# Patient Record
Sex: MALE | Race: WHITE | NOT HISPANIC OR LATINO | URBAN - METROPOLITAN AREA
[De-identification: names, ages, dates, MRNs, and addresses within clinical notes are randomized per-mention and may not be internally consistent; named-entity substitution may affect disease eponyms.]

---

## 2023-08-22 PROBLEM — M79.652 LEFT THIGH PAIN: Status: ACTIVE | Noted: 2023-08-22

## 2023-08-22 PROBLEM — M65.90 TENOSYNOVITIS: Status: ACTIVE | Noted: 2023-08-22

## 2023-08-22 PROBLEM — I10 ESSENTIAL HYPERTENSION: Status: ACTIVE | Noted: 2023-08-22

## 2023-08-22 PROBLEM — Z96.1 ARTIFICIAL LENS PRESENT: Status: ACTIVE | Noted: 2023-08-22

## 2023-08-22 PROBLEM — I48.19 PERSISTENT ATRIAL FIBRILLATION (MULTI): Status: ACTIVE | Noted: 2023-08-22

## 2023-08-22 PROBLEM — R42 LIGHTHEADEDNESS: Status: ACTIVE | Noted: 2023-08-22

## 2023-08-22 PROBLEM — M25.552 LEFT HIP PAIN: Status: ACTIVE | Noted: 2023-08-22

## 2023-08-22 PROBLEM — E55.9 VITAMIN D DEFICIENCY: Status: ACTIVE | Noted: 2023-08-22

## 2023-08-22 PROBLEM — M1A.9XX0 CHRONIC GOUT WITHOUT TOPHUS: Status: ACTIVE | Noted: 2023-08-22

## 2023-08-22 PROBLEM — I27.20 PULMONARY HTN (MULTI): Status: ACTIVE | Noted: 2023-08-22

## 2023-08-22 PROBLEM — L03.119 CELLULITIS AND ABSCESS OF UPPER EXTREMITY: Status: ACTIVE | Noted: 2023-08-22

## 2023-08-22 PROBLEM — L02.419 CELLULITIS AND ABSCESS OF UPPER EXTREMITY: Status: ACTIVE | Noted: 2023-08-22

## 2023-08-22 PROBLEM — N40.1 BENIGN LOCALIZED PROSTATIC HYPERPLASIA WITH LOWER URINARY TRACT SYMPTOMS (LUTS): Status: ACTIVE | Noted: 2023-08-22

## 2023-08-22 PROBLEM — R55 SYNCOPE AND COLLAPSE: Status: ACTIVE | Noted: 2023-08-22

## 2023-08-22 PROBLEM — E11.9 TYPE 2 DIABETES MELLITUS WITHOUT COMPLICATION, WITHOUT LONG-TERM CURRENT USE OF INSULIN (MULTI): Status: ACTIVE | Noted: 2023-08-22

## 2023-08-22 PROBLEM — L30.9 ECZEMA: Status: ACTIVE | Noted: 2023-08-22
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
## 2023-08-22 PROBLEM — E78.5 HYPERLIPIDEMIA: Status: ACTIVE | Noted: 2023-08-22

## 2023-08-22 PROBLEM — M65.9 TENOSYNOVITIS: Status: ACTIVE | Noted: 2023-08-22

## 2023-08-22 PROBLEM — S09.90XA INJURY OF HEAD: Status: ACTIVE | Noted: 2023-08-22

## 2023-08-22 RX ORDER — GLUCOSAM/CHONDRO/HERB 149/HYAL 750-100 MG
1 TABLET ORAL DAILY
COMMUNITY

## 2023-08-22 RX ORDER — LOVASTATIN 40 MG/1
80 TABLET ORAL DAILY
COMMUNITY
End: 2024-03-18 | Stop reason: SDUPTHER

## 2023-08-22 RX ORDER — ACETAMINOPHEN 325 MG/1
325 TABLET ORAL EVERY 4 HOURS PRN
COMMUNITY

## 2023-08-22 RX ORDER — ALLOPURINOL 300 MG/1
300 TABLET ORAL DAILY
COMMUNITY
Start: 2021-07-14 | End: 2023-10-16 | Stop reason: SDUPTHER

## 2023-08-22 RX ORDER — WARFARIN 2 MG/1
2 TABLET ORAL DAILY
COMMUNITY
End: 2023-12-27 | Stop reason: SDUPTHER

## 2023-08-22 RX ORDER — PYRIDOXINE HCL (VITAMIN B6) 100 MG
100 TABLET ORAL DAILY
COMMUNITY

## 2023-08-22 RX ORDER — METOPROLOL TARTRATE 50 MG/1
50 TABLET ORAL 2 TIMES DAILY
COMMUNITY
Start: 2017-08-03 | End: 2024-04-22 | Stop reason: SDUPTHER

## 2023-08-22 RX ORDER — FUROSEMIDE 20 MG/1
20 TABLET ORAL DAILY
COMMUNITY

## 2023-08-22 RX ORDER — SILDENAFIL 100 MG/1
100 TABLET, FILM COATED ORAL DAILY PRN
COMMUNITY
End: 2023-10-02 | Stop reason: ALTCHOICE

## 2023-08-22 RX ORDER — LISINOPRIL 20 MG/1
20 TABLET ORAL DAILY
COMMUNITY
End: 2023-12-27 | Stop reason: SDUPTHER

## 2023-08-22 RX ORDER — NAPROXEN SODIUM 220 MG/1
81 TABLET, FILM COATED ORAL DAILY
COMMUNITY

## 2023-08-22 RX ORDER — CARBAMAZEPINE 100 MG/1
2 TABLET, CHEWABLE ORAL DAILY
COMMUNITY
End: 2023-10-02 | Stop reason: SDUPTHER

## 2023-10-02 ENCOUNTER — OFFICE VISIT (OUTPATIENT)
Dept: PRIMARY CARE | Facility: CLINIC | Age: 78
End: 2023-10-02
Payer: MEDICARE

## 2023-10-02 VITALS
WEIGHT: 196 LBS | OXYGEN SATURATION: 93 % | BODY MASS INDEX: 30.7 KG/M2 | HEART RATE: 66 BPM | SYSTOLIC BLOOD PRESSURE: 144 MMHG | DIASTOLIC BLOOD PRESSURE: 78 MMHG

## 2023-10-02 DIAGNOSIS — I48.19 PERSISTENT ATRIAL FIBRILLATION (MULTI): ICD-10-CM

## 2023-10-02 DIAGNOSIS — J30.2 SEASONAL ALLERGIES: ICD-10-CM

## 2023-10-02 DIAGNOSIS — I10 ESSENTIAL HYPERTENSION: ICD-10-CM

## 2023-10-02 DIAGNOSIS — G50.0 TRIGEMINAL NEURALGIA OF RIGHT SIDE OF FACE: ICD-10-CM

## 2023-10-02 DIAGNOSIS — G50.0 TRIGEMINAL NEURALGIA OF RIGHT SIDE OF FACE: Primary | ICD-10-CM

## 2023-10-02 PROCEDURE — 3077F SYST BP >= 140 MM HG: CPT

## 2023-10-02 PROCEDURE — 1160F RVW MEDS BY RX/DR IN RCRD: CPT

## 2023-10-02 PROCEDURE — 99213 OFFICE O/P EST LOW 20 MIN: CPT

## 2023-10-02 PROCEDURE — 1159F MED LIST DOCD IN RCRD: CPT

## 2023-10-02 PROCEDURE — 3078F DIAST BP <80 MM HG: CPT

## 2023-10-02 PROCEDURE — 1126F AMNT PAIN NOTED NONE PRSNT: CPT

## 2023-10-02 RX ORDER — CARBAMAZEPINE 100 MG/1
200 TABLET, CHEWABLE ORAL DAILY
Qty: 180 TABLET | Refills: 1 | Status: SHIPPED | OUTPATIENT
Start: 2023-10-02 | End: 2023-10-04

## 2023-10-02 ASSESSMENT — ENCOUNTER SYMPTOMS
WHEEZING: 1
CHILLS: 0
SHORTNESS OF BREATH: 0
FATIGUE: 0
COUGH: 1
DIZZINESS: 0
SORE THROAT: 1
FEVER: 0
LIGHT-HEADEDNESS: 0

## 2023-10-02 ASSESSMENT — PAIN SCALES - GENERAL: PAINLEVEL: 0-NO PAIN

## 2023-10-02 NOTE — PATIENT INSTRUCTIONS
Patient informed to continue current HTN drugs, will not make any changes at this time since is only slightly elevated, and has been normal for the past 3 months. Patient informed to continue taking daily BP and if the next two weeks still have elevated reading, can discuss slight increase in medication, probably will increase Lisinopril if needed.     Patient informed to add Flonase Sensimist to daily Claritin to help improve seasonal allergies and cough.     Patient aware carbamazepine effects Warfarin and does home INR and has been able to adjust his medication as needed.

## 2023-10-02 NOTE — PROGRESS NOTES
Subjective   Patient ID: Hunter Gorman is a 78 y.o. male who presents for medication follow up (Pt is here to discuss increasing carbamazepine and getting back on it for the nerve disorder ).    HPI   Patient was previously treated with Carbamazepine for Rt side trigeminal neuralgia. Patient was off medication for 3 weeks, titrated himself slowly off, and pain returned. Patient then titrated back himself to 200 mg and is controlled, needs refill. Exacerbated with chewing and touch.     HTN: 20 mg Lisinopril, Metoprolol tartate 50 mg BID, Lasix 20 mg. Patient last three BP at home elevated at 155/142/146 over 70's. However prior BP have been on average 130's/70's. Denies chest pain, dizziness, light-headiness, diaphoresis.     Patient 90 days, 100 mg BID, can mail   Dry Lube.     Review of Systems   Constitutional:  Negative for chills, fatigue and fever.   HENT:  Positive for postnasal drip and sore throat.    Respiratory:  Positive for cough and wheezing. Negative for shortness of breath.    Cardiovascular:  Positive for leg swelling. Negative for chest pain.   Neurological:  Negative for dizziness and light-headedness.       Objective   /78   Pulse 66   Wt 88.9 kg (196 lb)   SpO2 93%   BMI 30.70 kg/m²     Physical Exam  Constitutional:       General: He is not in acute distress.     Appearance: Normal appearance.   HENT:      Right Ear: Tympanic membrane and ear canal normal.      Left Ear: Tympanic membrane and ear canal normal.      Nose: Rhinorrhea present.      Mouth/Throat:      Mouth: Mucous membranes are moist.      Pharynx: Oropharynx is clear. Posterior oropharyngeal erythema present.      Comments: +post-nasal drainage  Eyes:      Conjunctiva/sclera: Conjunctivae normal.   Cardiovascular:      Rate and Rhythm: Normal rate. Rhythm irregular.   Pulmonary:      Breath sounds: Normal breath sounds. No wheezing, rhonchi or rales.   Musculoskeletal:      Right lower le+ Pitting Edema  present.      Left lower le+ Pitting Edema present.   Skin:     General: Skin is warm and dry.   Neurological:      Mental Status: He is alert.         Assessment/Plan   Diagnoses and all orders for this visit:  Trigeminal neuralgia of right side of face  -     carBAMazepine (TEGretol) 100 mg chewable tablet; Chew 2 tablets (200 mg) once daily.  Essential hypertension  Persistent atrial fibrillation (CMS/HCC)  Seasonal allergies    Patient informed to continue current HTN drugs, will not make any changes at this time since is only slightly elevated, and has been normal for the past 3 months. Patient informed to continue taking daily BP and if the next two weeks still have elevated reading, can discuss slight increase in medication, probably will increase Lisinopril if needed.     Patient informed to add Flonase Sensimist to daily Claritin to help improve seasonal allergies and cough.     Patient aware carbamazepine effects Warfarin and does home INR and has been able to adjust his medication as needed.

## 2023-10-04 RX ORDER — CARBAMAZEPINE 100 MG/1
TABLET, CHEWABLE ORAL
Qty: 180 TABLET | Refills: 1 | Status: SHIPPED | OUTPATIENT
Start: 2023-10-04 | End: 2023-10-12 | Stop reason: DRUGHIGH

## 2023-10-12 ENCOUNTER — CLINICAL SUPPORT (OUTPATIENT)
Dept: OPHTHALMOLOGY | Facility: CLINIC | Age: 78
End: 2023-10-12
Payer: MEDICARE

## 2023-10-12 ENCOUNTER — OFFICE VISIT (OUTPATIENT)
Dept: OPHTHALMOLOGY | Facility: CLINIC | Age: 78
End: 2023-10-12
Payer: MEDICARE

## 2023-10-12 ENCOUNTER — APPOINTMENT (OUTPATIENT)
Dept: OPHTHALMOLOGY | Facility: CLINIC | Age: 78
End: 2023-10-12
Payer: MEDICARE

## 2023-10-12 DIAGNOSIS — H02.889 MEIBOMIAN GLAND DYSFUNCTION: ICD-10-CM

## 2023-10-12 DIAGNOSIS — G50.0 TRIGEMINAL NEURALGIA OF RIGHT SIDE OF FACE: ICD-10-CM

## 2023-10-12 DIAGNOSIS — D31.32 BENIGN NEOPLASM OF CHOROID OF LEFT EYE: ICD-10-CM

## 2023-10-12 DIAGNOSIS — Z96.1 PSEUDOPHAKIA OF BOTH EYES: ICD-10-CM

## 2023-10-12 DIAGNOSIS — E11.9 TYPE 2 DIABETES MELLITUS WITHOUT COMPLICATION, WITHOUT LONG-TERM CURRENT USE OF INSULIN (MULTI): Primary | ICD-10-CM

## 2023-10-12 DIAGNOSIS — H52.7 REFRACTIVE ERROR: ICD-10-CM

## 2023-10-12 PROCEDURE — 99214 OFFICE O/P EST MOD 30 MIN: CPT | Performed by: OPHTHALMOLOGY

## 2023-10-12 PROCEDURE — 92015 DETERMINE REFRACTIVE STATE: CPT | Performed by: OPHTHALMOLOGY

## 2023-10-12 RX ORDER — CARBAMAZEPINE 200 MG/1
200 TABLET ORAL DAILY
COMMUNITY
Start: 2022-12-05 | End: 2023-10-12 | Stop reason: DRUGHIGH

## 2023-10-12 RX ORDER — CARBAMAZEPINE 200 MG/1
200 TABLET ORAL DAILY
Qty: 30 TABLET | Refills: 0 | Status: CANCELLED | OUTPATIENT
Start: 2023-10-12

## 2023-10-12 RX ORDER — CARBAMAZEPINE 100 MG/1
TABLET, CHEWABLE ORAL
Qty: 180 TABLET | Refills: 1 | Status: SHIPPED | OUTPATIENT
Start: 2023-10-12

## 2023-10-12 ASSESSMENT — REFRACTION_WEARINGRX
OS_SPHERE: +1.25
OS_ADD: +2.75
OS_CYLINDER: -0.75
SPECS_TYPE: BIFOCAL
OD_AXIS: 111
OD_ADD: +2.75
OD_SPHERE: +0.75
OS_AXIS: 092
OD_CYLINDER: -0.50

## 2023-10-12 ASSESSMENT — KERATOMETRY
OD_AXISANGLE_DEGREES: 10
OD_K1POWER_DIOPTERS: 44.75
OS_AXISANGLE2_DEGREES: 100
OD_AXISANGLE2_DEGREES: 100
OS_K1POWER_DIOPTERS: 44.25
OS_AXISANGLE_DEGREES: 10
OD_K2POWER_DIOPTERS: 45.25
OS_K2POWER_DIOPTERS: 45.00
METHOD_AUTO_MANUAL: AUTOMATED

## 2023-10-12 ASSESSMENT — TONOMETRY
IOP_METHOD: GOLDMANN APPLANATION
OS_IOP_MMHG: 16
OD_IOP_MMHG: 17

## 2023-10-12 ASSESSMENT — ENCOUNTER SYMPTOMS
HEMATOLOGIC/LYMPHATIC NEGATIVE: 0
EYES NEGATIVE: 0
ALLERGIC/IMMUNOLOGIC NEGATIVE: 0
PSYCHIATRIC NEGATIVE: 0
GASTROINTESTINAL NEGATIVE: 0
CARDIOVASCULAR NEGATIVE: 0
ENDOCRINE NEGATIVE: 0
MUSCULOSKELETAL NEGATIVE: 0
RESPIRATORY NEGATIVE: 0
CONSTITUTIONAL NEGATIVE: 0
NEUROLOGICAL NEGATIVE: 0

## 2023-10-12 ASSESSMENT — VISUAL ACUITY
METHOD: SNELLEN - LINEAR
OS_SC+: -1
OD_SC: 20/25
OD_SC+: +1
OD_CC: J1+
OS_SC: 20/25

## 2023-10-12 ASSESSMENT — REFRACTION_MANIFEST
OS_AXIS: 090
OD_CYLINDER: -1.25
OS_CYLINDER: -1.25
OD_AXIS: 100
OS_SPHERE: +0.50
METHOD_AUTOREFRACTION: 1
OD_SPHERE: -0.00

## 2023-10-12 ASSESSMENT — EXTERNAL EXAM - LEFT EYE: OS_EXAM: NORMAL

## 2023-10-12 ASSESSMENT — CUP TO DISC RATIO
OS_RATIO: 0.15
OD_RATIO: 0.3

## 2023-10-12 ASSESSMENT — EXTERNAL EXAM - RIGHT EYE: OD_EXAM: NORMAL

## 2023-10-12 NOTE — LETTER
"October 12, 2023    Ghazal Ferrera PA-C  [address]  [place] [address]    Patient: Hunter Gorman   YOB: 1945   Date of Visit: 10/12/2023       Dear Dr. Ghazal Ferrera PA-C:    Thank you for referring Hunter Gorman to me for evaluation. Here is my assessment and plan of care:    Assessment/Plan:  Hunter was seen today for annual exam.  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 If you have questions, please do not hesitate to call me. I look forward to following Hunter along with you.         Sincerely,        Toro Omalley MD        CC:   No Recipients         External Exam         Right Left    External Normal Normal              Slit Lamp Exam         Right Left    Lids/Lashes 3+ Meibomian gland dysfunction, Collarettes 3+ Meibomian gland dysfunction, Collarettes    Conjunctiva/Sclera normal bulbar and palepbral conjunctiva normal bulbar and palepbral conjunctiva    Cornea incision site looks good incision site looks good    Anterior Chamber ant. chamber deep and quiet ant. chamber deep and quiet    Iris iris normal iris normal    Lens posterior chamber IOL posterior chamber IOL              Fundus Exam         Right Left    Vitreous vitreous clear and normal vitreous clear and normal    Disc Normal Normal    C/D Ratio 0.3 0.15    Macula Normal Normal    Vessels normal retinal vessels normal retinal vessels    Periphery no proliferative diabetic retinopathy no proliferative diabetic retinopathy. choroidal nevus 1dd, inferior off arcade, flat, no subretinal fluid (SRF)                   <div id=\"MAIN_[email or web address]\"></div>     "

## 2023-10-12 NOTE — ASSESSMENT & PLAN NOTE
Advised no signs of diabetic changes on exam. Recommend to continue best sugar control and on need for annual checkup. Understands role of good BP control and weight loss if applicable. Discussed some components of selected studies like WESDR, DCCT, and UKPDS to describe the likely course of diabetes and effects on the eyes.

## 2023-10-12 NOTE — ASSESSMENT & PLAN NOTE
Stable intraocular lens (IOL) both eyes (OU) without any significant PCO, will monitor with serial exam.

## 2023-10-12 NOTE — PROGRESS NOTES
Assessment/Plan   Problem List Items Addressed This Visit          Eye/Vision problems    Pseudophakia of both eyes     Stable intraocular lens (IOL) both eyes (OU) without any significant PCO, will monitor with serial exam.          Type 2 diabetes mellitus without complication, without long-term current use of insulin (CMS/[facility]) - Primary     Advised no signs of diabetic changes on exam. Recommend to continue best sugar control and on need for annual checkup. Understands role of good BP control and weight loss if applicable. Discussed some components of selected studies like WESDR, DCCT, and UKPDS to describe the likely course of diabetes and effects on the eyes.           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         Meibomian gland dysfunction     Stable lid margin disease. Discussed increased heat and lid scrubs as needed. Continue lubrication as needed. Will have call if any flaring symptoms.          Refractive error     Discussed glasses prescription from [facility]. Will provide if patient interested in keeping for records or to fill as a new set of glasses.               Provided reassurance regarding above diagnoses and care received in the office visit today. Discussed outcomes and options along with the importance of treatment compliance. Understands the importance of any follow up visits. Patient instructed to call/communicate with our office if any new issues, questions, or concerns.     Will plan to see back in 1 year for full or sooner PRN

## 2023-10-12 NOTE — ASSESSMENT & PLAN NOTE
Stable lid margin disease. Discussed increased heat and lid scrubs as needed. Continue lubrication as needed. Will have call if any flaring symptoms.

## 2023-10-12 NOTE — ASSESSMENT & PLAN NOTE
Discussed glasses prescription from [facility]. Will provide if patient interested in keeping for records or to fill as a new set of glasses.

## 2023-10-12 NOTE — TELEPHONE ENCOUNTER
Sent [facility] to [facility], if still too expensive will just need to send in the 100 mg dose, which may be cheaper than , two 100 mg.

## 2023-10-12 NOTE — TELEPHONE ENCOUNTER
Pt called stating that the carbamazepine that was sent to [facility] is too expensive for pt. Pt would like the script sent to [facility] instead, please send a new script to that pharmacy.

## 2023-10-12 NOTE — PATIENT INSTRUCTIONS
Thank you so much for choosing me to provide your care today!    If you were dilated your vision may remain blurry   or light sensitive for several hours.    The nature of eye and vision problems can require frequent follow up, please make every effort to adhere to any future appointments.    If you have any issues, questions, or concerns,   please do not hesitate to reach out.    If you receive a survey in regards to your care today, please mention any exceptional care my office staff and/or technicians provided.    You can reach our office at this number:  [phone]

## 2023-10-16 DIAGNOSIS — M1A.00X0 IDIOPATHIC CHRONIC GOUT WITHOUT TOPHUS, UNSPECIFIED SITE: Primary | ICD-10-CM

## 2023-10-16 RX ORDER — ALLOPURINOL 300 MG/1
300 TABLET ORAL DAILY
Qty: 90 TABLET | Refills: 3 | Status: SHIPPED | OUTPATIENT
Start: 2023-10-16 | End: 2024-10-15

## 2023-11-09 ENCOUNTER — HOSPITAL ENCOUNTER (OUTPATIENT)
Dept: RADIOLOGY | Facility: EXTERNAL LOCATION | Age: 78
Discharge: HOME | End: 2023-11-09
Payer: MEDICARE

## 2023-11-09 DIAGNOSIS — M79.661 PAIN OF RIGHT LOWER LEG: ICD-10-CM

## 2023-11-09 DIAGNOSIS — S99.911A RIGHT ANKLE INJURY, INITIAL ENCOUNTER: ICD-10-CM

## 2023-11-21 ENCOUNTER — TELEMEDICINE (OUTPATIENT)
Dept: PRIMARY CARE | Facility: CLINIC | Age: 78
End: 2023-11-21
Payer: MEDICARE

## 2023-11-21 VITALS — WEIGHT: 194 LBS | BODY MASS INDEX: 30.38 KG/M2

## 2023-11-21 DIAGNOSIS — J01.90 ACUTE NON-RECURRENT SINUSITIS, UNSPECIFIED LOCATION: Primary | ICD-10-CM

## 2023-11-21 RX ORDER — BENZONATATE 200 MG/1
200 CAPSULE ORAL 3 TIMES DAILY PRN
Qty: 30 CAPSULE | Refills: 0 | Status: SHIPPED | OUTPATIENT
Start: 2023-11-21 | End: 2024-01-04 | Stop reason: WASHOUT

## 2023-11-21 RX ORDER — DOXYCYCLINE 100 MG/1
100 CAPSULE ORAL 2 TIMES DAILY
Qty: 10 CAPSULE | Refills: 0 | Status: SHIPPED | OUTPATIENT
Start: 2023-11-21 | End: 2023-11-26

## 2023-11-21 ASSESSMENT — PAIN SCALES - GENERAL: PAINLEVEL: 0-NO PAIN

## 2023-11-21 ASSESSMENT — PATIENT HEALTH QUESTIONNAIRE - PHQ9
SUM OF ALL RESPONSES TO PHQ9 QUESTIONS 1 AND 2: 0
1. LITTLE INTEREST OR PLEASURE IN DOING THINGS: NOT AT ALL
2. FEELING DOWN, DEPRESSED OR HOPELESS: NOT AT ALL

## 2023-11-21 ASSESSMENT — COLUMBIA-SUICIDE SEVERITY RATING SCALE - C-SSRS
6. HAVE YOU EVER DONE ANYTHING, STARTED TO DO ANYTHING, OR PREPARED TO DO ANYTHING TO END YOUR LIFE?: NO
1. IN THE PAST MONTH, HAVE YOU WISHED YOU WERE DEAD OR WISHED YOU COULD GO TO SLEEP AND NOT WAKE UP?: NO
2. HAVE YOU ACTUALLY HAD ANY THOUGHTS OF KILLING YOURSELF?: NO

## 2023-11-21 NOTE — PROGRESS NOTES
Subjective   Patient ID: Hunter Gorman is a 78 y.o. male who presents virtually for Sore Throat (Pt is here for sore throat, wet coughing, runny nose, chills, which started on Sunday  / nr).    HPI  79 yo male with sore throat  Pharyngitis/sinusitis  Symptoms started a week and a half ago  Sore throat, wet cough, runny nose, chills  Coughing up phlegm  Nasal congestion  Headaches  No fevers  Hasn't tested for covid  Some fatigue    Review of Systems  All pertinent positive symptoms are included in the history of present illness.    All other systems have been reviewed and are negative and noncontributory to this patient's current ailments.     Allergies   Allergen Reactions    Cephalexin Anaphylaxis and Itching    Clindamycin Anaphylaxis and Other     heartburn        Immunization History   Administered Date(s) Administered    DTaP vaccine, pediatric  (INFANRIX) 05/21/2010    Flu vaccine, quadrivalent, high-dose, preservative free, age 65y+ (FLUZONE) 10/12/2020, 10/04/2021    Influenza, High Dose Seasonal, Preservative Free 11/12/2015, 12/06/2016, 09/20/2017, 10/18/2018, 10/11/2019    Influenza, seasonal, injectable 09/20/2011, 11/19/2012, 11/20/2013, 11/04/2014    Moderna COVID-19 vaccine, bivalent, blue cap/gray label *Check age/dose* 10/19/2022    Moderna SARS-CoV-2 Vaccination 02/18/2021, 03/18/2021, 12/02/2021, 04/20/2022    Pneumococcal conjugate vaccine, 13-valent (PREVNAR 13) 11/12/2015    Pneumococcal polysaccharide vaccine, 23-valent, age 2 years and older (PNEUMOVAX 23) 07/21/2006, 12/01/2016    Tdap vaccine, age 7 year and older (BOOSTRIX) 06/26/2019    Zoster, live 07/30/2012       Objective   Vitals:    11/21/23 1117   Weight: 88 kg (194 lb)       Physical Exam Virtual visit  CONSTITUTIONAL - well nourished, well developed, looks like stated age, in no acute distress, not ill-appearing, and not tired appearing  CHEST - non labored breathing  PSYCHIATRIC - alert, pleasant and cordial,  age-appropriate    Assessment/Plan   HPI  77 yo male with sore throat  Pharyngitis/sinusitis  Start doxycycline, tessalon perles prn cough    RTC as needed

## 2023-12-27 DIAGNOSIS — I48.19 PERSISTENT ATRIAL FIBRILLATION (MULTI): ICD-10-CM

## 2023-12-27 DIAGNOSIS — I10 ESSENTIAL HYPERTENSION: Primary | ICD-10-CM

## 2023-12-28 RX ORDER — WARFARIN 2 MG/1
4 TABLET ORAL SEE ADMIN INSTRUCTIONS
Qty: 150 TABLET | Refills: 3 | Status: SHIPPED | OUTPATIENT
Start: 2023-12-28

## 2023-12-28 RX ORDER — LISINOPRIL 20 MG/1
20 TABLET ORAL DAILY
Qty: 90 TABLET | Refills: 1 | Status: SHIPPED | OUTPATIENT
Start: 2023-12-28 | End: 2024-02-19 | Stop reason: SDUPTHER

## 2024-01-03 ASSESSMENT — ENCOUNTER SYMPTOMS
COUGH: 0
NAUSEA: 0
MYALGIAS: 0
DIZZINESS: 0
FEVER: 0
SHORTNESS OF BREATH: 0
VOMITING: 0
FATIGUE: 0

## 2024-01-04 ENCOUNTER — OFFICE VISIT (OUTPATIENT)
Dept: PRIMARY CARE | Facility: CLINIC | Age: 79
End: 2024-01-04
Payer: MEDICARE

## 2024-01-04 VITALS
DIASTOLIC BLOOD PRESSURE: 74 MMHG | SYSTOLIC BLOOD PRESSURE: 136 MMHG | WEIGHT: 191.4 LBS | OXYGEN SATURATION: 97 % | BODY MASS INDEX: 29.98 KG/M2 | HEART RATE: 74 BPM

## 2024-01-04 DIAGNOSIS — J01.90 ACUTE RHINOSINUSITIS: ICD-10-CM

## 2024-01-04 DIAGNOSIS — I10 ESSENTIAL HYPERTENSION: Primary | ICD-10-CM

## 2024-01-04 DIAGNOSIS — I48.19 PERSISTENT ATRIAL FIBRILLATION (MULTI): ICD-10-CM

## 2024-01-04 DIAGNOSIS — M1A.9XX0 CHRONIC GOUT WITHOUT TOPHUS, UNSPECIFIED CAUSE, UNSPECIFIED SITE: ICD-10-CM

## 2024-01-04 DIAGNOSIS — E11.9 TYPE 2 DIABETES MELLITUS WITHOUT COMPLICATION, WITHOUT LONG-TERM CURRENT USE OF INSULIN (MULTI): ICD-10-CM

## 2024-01-04 DIAGNOSIS — E78.2 MIXED HYPERLIPIDEMIA: ICD-10-CM

## 2024-01-04 LAB — POC HEMOGLOBIN A1C: 5.6 % (ref 4.2–6.5)

## 2024-01-04 PROCEDURE — 1159F MED LIST DOCD IN RCRD: CPT | Performed by: PHYSICIAN ASSISTANT

## 2024-01-04 PROCEDURE — 3075F SYST BP GE 130 - 139MM HG: CPT | Performed by: PHYSICIAN ASSISTANT

## 2024-01-04 PROCEDURE — 99213 OFFICE O/P EST LOW 20 MIN: CPT | Performed by: PHYSICIAN ASSISTANT

## 2024-01-04 PROCEDURE — 1036F TOBACCO NON-USER: CPT | Performed by: PHYSICIAN ASSISTANT

## 2024-01-04 PROCEDURE — 1126F AMNT PAIN NOTED NONE PRSNT: CPT | Performed by: PHYSICIAN ASSISTANT

## 2024-01-04 PROCEDURE — 3078F DIAST BP <80 MM HG: CPT | Performed by: PHYSICIAN ASSISTANT

## 2024-01-04 RX ORDER — IPRATROPIUM BROMIDE 42 UG/1
2 SPRAY, METERED NASAL 4 TIMES DAILY
Qty: 15 ML | Refills: 0 | Status: SHIPPED | OUTPATIENT
Start: 2024-01-04 | End: 2024-01-09

## 2024-01-04 RX ORDER — FLUTICASONE PROPIONATE 50 MCG
1 SPRAY, SUSPENSION (ML) NASAL DAILY
COMMUNITY

## 2024-01-04 ASSESSMENT — COLUMBIA-SUICIDE SEVERITY RATING SCALE - C-SSRS
1. IN THE PAST MONTH, HAVE YOU WISHED YOU WERE DEAD OR WISHED YOU COULD GO TO SLEEP AND NOT WAKE UP?: NO
6. HAVE YOU EVER DONE ANYTHING, STARTED TO DO ANYTHING, OR PREPARED TO DO ANYTHING TO END YOUR LIFE?: NO
2. HAVE YOU ACTUALLY HAD ANY THOUGHTS OF KILLING YOURSELF?: NO

## 2024-01-04 ASSESSMENT — PAIN SCALES - GENERAL: PAINLEVEL: 0-NO PAIN

## 2024-01-04 ASSESSMENT — PATIENT HEALTH QUESTIONNAIRE - PHQ9
2. FEELING DOWN, DEPRESSED OR HOPELESS: NOT AT ALL
SUM OF ALL RESPONSES TO PHQ9 QUESTIONS 1 AND 2: 0
1. LITTLE INTEREST OR PLEASURE IN DOING THINGS: NOT AT ALL

## 2024-01-04 NOTE — PROGRESS NOTES
Subjective   Patient ID: Hunter Gorman is a 78 y.o. male who presents for Follow-up (Pt is here for 6 months follow up / nr) and Allergies (Pt has been having a runny nose, coughing, for about 6 weeks / nr).    HPI   Acute URI - started with a cold about 1mo ago. Saw Dr. Dash for telemed visit. Was treated with doxycycline + tessalon perles. Has frequent post-nasal drip which is causing     DM2 (diet controlled). A1c 5.6. no vision changes or neuropathy. Has regular eye care.    A-fib: controlled. Managed by Dr. Canada. On warfarin w/home INR monitoring. 2.1 today.    HTN: controlled on lisinopril, metoprolol    HLD: stable on lovastatin, ASA    Gout: controlled on allopurinol    Review of Systems   Constitutional:  Negative for fatigue and fever.   Respiratory:  Negative for cough and shortness of breath.    Cardiovascular:  Negative for chest pain.   Gastrointestinal:  Negative for nausea and vomiting.   Musculoskeletal:  Negative for myalgias.   Skin:  Negative for rash.   Neurological:  Negative for dizziness.       Objective   /74   Pulse 74   Wt 86.8 kg (191 lb 6.4 oz)   SpO2 97%   BMI 29.98 kg/m²     Physical Exam  Constitutional:       Appearance: Normal appearance.   HENT:      Head: Normocephalic and atraumatic.   Eyes:      Extraocular Movements: Extraocular movements intact.      Conjunctiva/sclera: Conjunctivae normal.   Cardiovascular:      Rate and Rhythm: Normal rate and regular rhythm.   Pulmonary:      Effort: Pulmonary effort is normal.      Breath sounds: Normal breath sounds. No wheezing or rhonchi.   Musculoskeletal:      Cervical back: Normal range of motion and neck supple.   Skin:     General: Skin is warm and dry.   Neurological:      General: No focal deficit present.      Mental Status: He is alert.   Psychiatric:         Mood and Affect: Mood normal.         Assessment/Plan   Problem List Items Addressed This Visit             ICD-10-CM    Chronic gout without  michelle [id number]    Relevant Orders    Uric acid    Essential hypertension - Primary I10    Relevant Orders    Comprehensive Metabolic Panel    Hyperlipidemia E78.5    Relevant Orders    Lipid Panel    Persistent atrial fibrillation (CMS/[facility]) I48.19    Type 2 diabetes mellitus without complication, without long-term current use of insulin (CMS/[facility]) E11.9    Relevant Orders    POCT glycosylated hemoglobin (Hb A1C) manually resulted     Other Visit Diagnoses         Codes    Acute rhinosinusitis     J01.90    Relevant Medications    ipratropium (Atrovent) 42 mcg (0.06 %) nasal spray

## 2024-01-05 ENCOUNTER — TELEPHONE (OUTPATIENT)
Dept: PRIMARY CARE | Facility: CLINIC | Age: 79
End: 2024-01-05
Payer: MEDICARE

## 2024-01-05 NOTE — TELEPHONE ENCOUNTER
Pt called stating he got the message from us to double his dose of lisinopril, and the pt called wanting to let you know he wants to start with 1.5 first and then possibly double the dose

## 2024-02-19 DIAGNOSIS — I10 ESSENTIAL HYPERTENSION: ICD-10-CM

## 2024-02-19 RX ORDER — LISINOPRIL 20 MG/1
20 TABLET ORAL DAILY
Qty: 90 TABLET | Refills: 1 | Status: SHIPPED | OUTPATIENT
Start: 2024-02-19 | End: 2024-04-22 | Stop reason: WASHOUT

## 2024-02-21 DIAGNOSIS — I10 ESSENTIAL HYPERTENSION: ICD-10-CM

## 2024-02-21 RX ORDER — LISINOPRIL 40 MG/1
40 TABLET ORAL DAILY
Qty: 30 TABLET | Refills: 1 | Status: SHIPPED | OUTPATIENT
Start: 2024-02-21 | End: 2024-04-22 | Stop reason: SDUPTHER

## 2024-03-18 DIAGNOSIS — E78.2 MIXED HYPERLIPIDEMIA: Primary | ICD-10-CM

## 2024-03-18 RX ORDER — LOVASTATIN 40 MG/1
80 TABLET ORAL DAILY
Qty: 90 TABLET | Refills: 1 | Status: SHIPPED | OUTPATIENT
Start: 2024-03-18

## 2024-04-22 DIAGNOSIS — I10 ESSENTIAL HYPERTENSION: ICD-10-CM

## 2024-04-22 RX ORDER — LISINOPRIL 40 MG/1
40 TABLET ORAL DAILY
Qty: 90 TABLET | Refills: 1 | Status: SHIPPED | OUTPATIENT
Start: 2024-04-22 | End: 2024-10-19

## 2024-04-22 RX ORDER — METOPROLOL TARTRATE 50 MG/1
50 TABLET ORAL 2 TIMES DAILY
Qty: 180 TABLET | Refills: 1 | Status: SHIPPED | OUTPATIENT
Start: 2024-04-22

## 2024-04-25 ENCOUNTER — LAB (OUTPATIENT)
Dept: LAB | Facility: LAB | Age: 79
End: 2024-04-25
Payer: MEDICARE

## 2024-04-25 DIAGNOSIS — M1A.9XX0 CHRONIC GOUT WITHOUT TOPHUS, UNSPECIFIED CAUSE, UNSPECIFIED SITE: ICD-10-CM

## 2024-04-25 DIAGNOSIS — E78.2 MIXED HYPERLIPIDEMIA: ICD-10-CM

## 2024-04-25 DIAGNOSIS — I10 ESSENTIAL HYPERTENSION: ICD-10-CM

## 2024-04-25 LAB
ALBUMIN SERPL-MCNC: 4.3 G/DL (ref 3.5–5)
ALP BLD-CCNC: 61 U/L (ref 35–125)
ALT SERPL-CCNC: 15 U/L (ref 5–40)
ANION GAP SERPL CALC-SCNC: 11 MMOL/L
AST SERPL-CCNC: 25 U/L (ref 5–40)
BILIRUB SERPL-MCNC: 0.6 MG/DL (ref 0.1–1.2)
BUN SERPL-MCNC: 25 MG/DL (ref 8–25)
CALCIUM SERPL-MCNC: 9.4 MG/DL (ref 8.5–10.4)
CHLORIDE SERPL-SCNC: 106 MMOL/L (ref 97–107)
CHOLEST SERPL-MCNC: 177 MG/DL (ref 133–200)
CHOLEST/HDLC SERPL: 3.3 {RATIO}
CO2 SERPL-SCNC: 27 MMOL/L (ref 24–31)
CREAT SERPL-MCNC: 1.7 MG/DL (ref 0.4–1.6)
EGFRCR SERPLBLD CKD-EPI 2021: 41 ML/MIN/1.73M*2
GLUCOSE SERPL-MCNC: 117 MG/DL (ref 65–99)
HDLC SERPL-MCNC: 54 MG/DL
LDLC SERPL CALC-MCNC: 108 MG/DL (ref 65–130)
POTASSIUM SERPL-SCNC: 4.7 MMOL/L (ref 3.4–5.1)
PROT SERPL-MCNC: 6.5 G/DL (ref 5.9–7.9)
SODIUM SERPL-SCNC: 144 MMOL/L (ref 133–145)
TRIGL SERPL-MCNC: 77 MG/DL (ref 40–150)
URATE SERPL-MCNC: 4.1 MG/DL (ref 3.6–7.7)

## 2024-04-25 PROCEDURE — 84550 ASSAY OF BLOOD/URIC ACID: CPT

## 2024-04-25 PROCEDURE — 80061 LIPID PANEL: CPT

## 2024-04-25 PROCEDURE — 80053 COMPREHEN METABOLIC PANEL: CPT

## 2024-04-25 PROCEDURE — 36415 COLL VENOUS BLD VENIPUNCTURE: CPT

## 2024-04-29 ENCOUNTER — TELEPHONE (OUTPATIENT)
Dept: PRIMARY CARE | Facility: CLINIC | Age: 79
End: 2024-04-29
Payer: MEDICARE

## 2024-04-29 NOTE — TELEPHONE ENCOUNTER
Patient called and spoke with Lily about lab results. Lily replayed message to patient. Patient is okay with a new medication, if a small supply, he would like it sent it [facility]. If 90 day, please send to [facility].

## 2024-05-01 DIAGNOSIS — I10 ESSENTIAL HYPERTENSION: Primary | ICD-10-CM

## 2024-05-01 RX ORDER — AMLODIPINE BESYLATE 5 MG/1
5 TABLET ORAL DAILY
Qty: 30 TABLET | Refills: 0 | Status: SHIPPED | OUTPATIENT
Start: 2024-05-01 | End: 2024-05-20 | Stop reason: SDUPTHER

## 2024-05-01 NOTE — TELEPHONE ENCOUNTER
Okay I sent him a 30 day supply of amlodipine to [facility]. Most common side effects is swelling in hands/feet so keep an eye out for that. Please schedule him a 2 week NV for BP check. Should go back to 20mg of lisinopril. We'll recheck labs after BP check

## 2024-05-02 NOTE — TELEPHONE ENCOUNTER
Spoke with patient, confirmed and scheduled appointment for 2 weeks nurse visit to have his blood pressure rechecked after starting Amlodipine, he understood above message

## 2024-05-17 ENCOUNTER — OFFICE VISIT (OUTPATIENT)
Dept: PRIMARY CARE | Facility: CLINIC | Age: 79
End: 2024-05-17
Payer: MEDICARE

## 2024-05-17 VITALS — HEART RATE: 97 BPM | DIASTOLIC BLOOD PRESSURE: 82 MMHG | SYSTOLIC BLOOD PRESSURE: 152 MMHG

## 2024-05-17 DIAGNOSIS — I10 ESSENTIAL (PRIMARY) HYPERTENSION: ICD-10-CM

## 2024-05-17 DIAGNOSIS — N40.1 BENIGN LOCALIZED PROSTATIC HYPERPLASIA WITH LOWER URINARY TRACT SYMPTOMS (LUTS): Primary | ICD-10-CM

## 2024-05-17 PROCEDURE — 1159F MED LIST DOCD IN RCRD: CPT | Performed by: PHYSICIAN ASSISTANT

## 2024-05-17 PROCEDURE — 1157F ADVNC CARE PLAN IN RCRD: CPT | Performed by: PHYSICIAN ASSISTANT

## 2024-05-17 PROCEDURE — 3077F SYST BP >= 140 MM HG: CPT | Performed by: PHYSICIAN ASSISTANT

## 2024-05-17 PROCEDURE — 1158F ADVNC CARE PLAN TLK DOCD: CPT | Performed by: PHYSICIAN ASSISTANT

## 2024-05-17 PROCEDURE — 1123F ACP DISCUSS/DSCN MKR DOCD: CPT | Performed by: PHYSICIAN ASSISTANT

## 2024-05-17 PROCEDURE — 3079F DIAST BP 80-89 MM HG: CPT | Performed by: PHYSICIAN ASSISTANT

## 2024-05-17 NOTE — PROGRESS NOTES
Here for BP check. Had lisinopril increased to 40mg but developed decline in GFR. We cut lisinopril back to 20mg and added 5mg of amlodipine.

## 2024-05-20 DIAGNOSIS — I10 ESSENTIAL HYPERTENSION: ICD-10-CM

## 2024-05-20 RX ORDER — AMLODIPINE BESYLATE 5 MG/1
5 TABLET ORAL DAILY
Qty: 90 TABLET | Refills: 1 | Status: SHIPPED | OUTPATIENT
Start: 2024-05-20 | End: 2024-11-16

## 2024-07-12 ENCOUNTER — OFFICE VISIT (OUTPATIENT)
Dept: PRIMARY CARE | Facility: CLINIC | Age: 79
End: 2024-07-12
Payer: MEDICARE

## 2024-07-12 VITALS
OXYGEN SATURATION: 97 % | BODY MASS INDEX: 30.04 KG/M2 | DIASTOLIC BLOOD PRESSURE: 60 MMHG | HEIGHT: 67 IN | SYSTOLIC BLOOD PRESSURE: 122 MMHG | HEART RATE: 59 BPM | WEIGHT: 191.4 LBS

## 2024-07-12 DIAGNOSIS — Z87.39 HISTORY OF GOUT: Primary | ICD-10-CM

## 2024-07-12 DIAGNOSIS — N18.2 TYPE 2 DIABETES MELLITUS WITH STAGE 2 CHRONIC KIDNEY DISEASE, WITHOUT LONG-TERM CURRENT USE OF INSULIN (MULTI): ICD-10-CM

## 2024-07-12 DIAGNOSIS — E78.2 MIXED HYPERLIPIDEMIA: ICD-10-CM

## 2024-07-12 DIAGNOSIS — E11.22 TYPE 2 DIABETES MELLITUS WITH STAGE 2 CHRONIC KIDNEY DISEASE, WITHOUT LONG-TERM CURRENT USE OF INSULIN (MULTI): ICD-10-CM

## 2024-07-12 DIAGNOSIS — I10 ESSENTIAL HYPERTENSION: ICD-10-CM

## 2024-07-12 DIAGNOSIS — I48.19 PERSISTENT ATRIAL FIBRILLATION (MULTI): ICD-10-CM

## 2024-07-12 LAB — POC HEMOGLOBIN A1C: 5.7 % (ref 4.2–6.5)

## 2024-07-12 PROCEDURE — 1123F ACP DISCUSS/DSCN MKR DOCD: CPT | Performed by: PHYSICIAN ASSISTANT

## 2024-07-12 PROCEDURE — 1157F ADVNC CARE PLAN IN RCRD: CPT | Performed by: PHYSICIAN ASSISTANT

## 2024-07-12 PROCEDURE — 1126F AMNT PAIN NOTED NONE PRSNT: CPT | Performed by: PHYSICIAN ASSISTANT

## 2024-07-12 PROCEDURE — 3074F SYST BP LT 130 MM HG: CPT | Performed by: PHYSICIAN ASSISTANT

## 2024-07-12 PROCEDURE — 1036F TOBACCO NON-USER: CPT | Performed by: PHYSICIAN ASSISTANT

## 2024-07-12 PROCEDURE — 1158F ADVNC CARE PLAN TLK DOCD: CPT | Performed by: PHYSICIAN ASSISTANT

## 2024-07-12 PROCEDURE — 83036 HEMOGLOBIN GLYCOSYLATED A1C: CPT | Performed by: PHYSICIAN ASSISTANT

## 2024-07-12 PROCEDURE — 99214 OFFICE O/P EST MOD 30 MIN: CPT | Performed by: PHYSICIAN ASSISTANT

## 2024-07-12 PROCEDURE — 3078F DIAST BP <80 MM HG: CPT | Performed by: PHYSICIAN ASSISTANT

## 2024-07-12 PROCEDURE — 1160F RVW MEDS BY RX/DR IN RCRD: CPT | Performed by: PHYSICIAN ASSISTANT

## 2024-07-12 PROCEDURE — 1159F MED LIST DOCD IN RCRD: CPT | Performed by: PHYSICIAN ASSISTANT

## 2024-07-12 ASSESSMENT — ENCOUNTER SYMPTOMS
FATIGUE: 0
COUGH: 0
DIZZINESS: 0
MYALGIAS: 0
SHORTNESS OF BREATH: 0
VOMITING: 0
FEVER: 0
NAUSEA: 0

## 2024-07-12 ASSESSMENT — PATIENT HEALTH QUESTIONNAIRE - PHQ9
1. LITTLE INTEREST OR PLEASURE IN DOING THINGS: NOT AT ALL
2. FEELING DOWN, DEPRESSED OR HOPELESS: NOT AT ALL
SUM OF ALL RESPONSES TO PHQ9 QUESTIONS 1 AND 2: 0

## 2024-07-12 ASSESSMENT — PAIN SCALES - GENERAL: PAINLEVEL: 0-NO PAIN

## 2024-07-12 NOTE — PROGRESS NOTES
"Subjective   Patient ID: Hunter Gorman is a 79 y.o. male who presents for 6 month follow up.    HPI   HTN - controlled on 20mg lisinopril (40mg caused ZACH), 5mg lisinopril, 50mg metoprolol.     T2DM - diet-controlled. A1c 5.7. upcoming eye exam in october. Foot exam updated today. +statin, ACEi. PNA vaccine UTD.    A-fib - controlled. Managed by Dr. Canada - has upcoming appt next week. On warfarin w/home INR monitoring.    HLD - stable on lovastatin, ASA    Gout - controlled on allopurinol. He does have some tingling in R lateral toes.     Review of Systems   Constitutional:  Negative for fatigue and fever.   Respiratory:  Negative for cough and shortness of breath.    Cardiovascular:  Negative for chest pain.   Gastrointestinal:  Negative for nausea and vomiting.   Musculoskeletal:  Negative for myalgias.   Skin:  Negative for rash.   Neurological:  Negative for dizziness.       Objective   /60   Pulse 59   Ht 1.702 m (5' 7\")   Wt 86.8 kg (191 lb 6.4 oz)   SpO2 97%   BMI 29.98 kg/m²     Physical Exam  Constitutional:       Appearance: Normal appearance.   HENT:      Head: Normocephalic and atraumatic.   Eyes:      Extraocular Movements: Extraocular movements intact.      Conjunctiva/sclera: Conjunctivae normal.   Cardiovascular:      Rate and Rhythm: Normal rate and regular rhythm.      Pulses:           Dorsalis pedis pulses are 2+ on the right side and 2+ on the left side.   Pulmonary:      Effort: Pulmonary effort is normal.      Breath sounds: Normal breath sounds. No wheezing or rhonchi.   Musculoskeletal:      Cervical back: Normal range of motion and neck supple.      Right foot: No deformity.      Left foot: No deformity.   Feet:      Right foot:      Protective Sensation: 4 sites tested.  4 sites sensed.      Skin integrity: Dry skin present.      Toenail Condition: Right toenails are abnormally thick. Fungal disease present.     Left foot:      Protective Sensation: 4 sites tested.  4 " sites sensed.      Skin integrity: Dry skin present.      Toenail Condition: Left toenails are abnormally thick. Fungal disease present.  Skin:     General: Skin is warm and dry.   Neurological:      General: No focal deficit present.      Mental Status: He is alert.   Psychiatric:         Mood and Affect: Mood normal.         Assessment/Plan   Problem List Items Addressed This Visit             ICD-10-CM    Essential hypertension I10    Relevant Orders    Basic Metabolic Panel    Hyperlipidemia E78.5    Persistent atrial fibrillation (Multi) I48.19    Type 2 diabetes mellitus with stage 2 chronic kidney disease, without long-term current use of insulin (Multi) E11.22, N18.2    Relevant Orders    POCT glycosylated hemoglobin (Hb A1C) manually resulted (Completed)    Albumin-Creatinine Ratio, Urine Random    History of gout - Primary Z87.39    Relevant Orders    Uric acid       FU 6mos

## 2024-07-18 ENCOUNTER — OFFICE VISIT (OUTPATIENT)
Dept: CARDIOLOGY | Facility: CLINIC | Age: 79
End: 2024-07-18
Payer: MEDICARE

## 2024-07-18 VITALS — HEART RATE: 54 BPM | DIASTOLIC BLOOD PRESSURE: 50 MMHG | SYSTOLIC BLOOD PRESSURE: 102 MMHG

## 2024-07-18 DIAGNOSIS — I48.19 PERSISTENT ATRIAL FIBRILLATION (MULTI): Primary | ICD-10-CM

## 2024-07-18 PROCEDURE — 3074F SYST BP LT 130 MM HG: CPT | Performed by: INTERNAL MEDICINE

## 2024-07-18 PROCEDURE — 93010 ELECTROCARDIOGRAM REPORT: CPT | Performed by: INTERNAL MEDICINE

## 2024-07-18 PROCEDURE — 3078F DIAST BP <80 MM HG: CPT | Performed by: INTERNAL MEDICINE

## 2024-07-18 PROCEDURE — 1159F MED LIST DOCD IN RCRD: CPT | Performed by: INTERNAL MEDICINE

## 2024-07-18 PROCEDURE — 99213 OFFICE O/P EST LOW 20 MIN: CPT | Performed by: INTERNAL MEDICINE

## 2024-07-18 PROCEDURE — 1157F ADVNC CARE PLAN IN RCRD: CPT | Performed by: INTERNAL MEDICINE

## 2024-07-18 PROCEDURE — 93005 ELECTROCARDIOGRAM TRACING: CPT | Performed by: INTERNAL MEDICINE

## 2024-07-18 PROCEDURE — 1123F ACP DISCUSS/DSCN MKR DOCD: CPT | Performed by: INTERNAL MEDICINE

## 2024-07-18 RX ORDER — CETIRIZINE HYDROCHLORIDE 10 MG/1
10 TABLET ORAL DAILY
COMMUNITY

## 2024-07-18 RX ORDER — CEPHRADINE 500 MG
1 CAPSULE ORAL DAILY
COMMUNITY

## 2024-07-18 ASSESSMENT — COLUMBIA-SUICIDE SEVERITY RATING SCALE - C-SSRS
1. IN THE PAST MONTH, HAVE YOU WISHED YOU WERE DEAD OR WISHED YOU COULD GO TO SLEEP AND NOT WAKE UP?: NO
2. HAVE YOU ACTUALLY HAD ANY THOUGHTS OF KILLING YOURSELF?: NO
6. HAVE YOU EVER DONE ANYTHING, STARTED TO DO ANYTHING, OR PREPARED TO DO ANYTHING TO END YOUR LIFE?: NO

## 2024-07-18 NOTE — PROGRESS NOTES
No chief complaint on file.           The patient is a 79-year-old male with a history of hypertension and permanent atrial fibrillation.  We have taken a rate control approach with anticoagulation and is here for his yearly follow-up.  He continues to do well with really no symptoms whatsoever.  He remains quite active and monitors his blood pressure and heart rate regularly.  Normally his heart rates in the 50 to 60 bpm range.  He has no lightheadedness or dizziness         Active Ambulatory Problems     Diagnosis Date Noted    Pseudophakia of both eyes 08/22/2023    Benign localized prostatic hyperplasia with lower urinary tract symptoms (LUTS) 08/22/2023    Cellulitis and abscess of upper extremity 08/22/2023    Chronic gout without tophus 08/22/2023    Eczema 08/22/2023    Essential hypertension 08/22/2023    Fall 08/22/2023    Hyperlipidemia 08/22/2023    Injury of head 08/22/2023    Left hip pain 08/22/2023    Left thigh pain 08/22/2023    Lightheadedness 08/22/2023    Persistent atrial fibrillation (Multi) 08/22/2023    Pulmonary HTN (Multi) 08/22/2023    Syncope and collapse 08/22/2023    Tenosynovitis 08/22/2023    Type 2 diabetes mellitus with stage 2 chronic kidney disease, without long-term current use of insulin (Multi) 08/22/2023    Vitamin D deficiency 08/22/2023    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       Objective     There were no vitals filed for this visit.     Constitutional:       Appearance: Healthy  appearance.   Pulmonary:      Effort: Pulmonary effort is normal.      Breath sounds: Normal breath sounds.   Cardiovascular:      PMI at left midclavicular line. Tachycardia present. Irregularly irregular rhythm.      Murmurs: There is a systolic murmur.      No gallop.  No click. No rub.   Pulses:     Intact distal pulses.   Abdominal:      General: Bowel sounds are normal.   Musculoskeletal:      Cervical back: Neck supple. Skin:     General: Skin is warm and dry.   Neurological:      General: No focal deficit present.            Lab Review:   Office Visit on 07/12/2024   Component Date Value    POC HEMOGLOBIN A1c 07/12/2024 5.7        ECG:  Atrial fibrillation with ventricular response in the 50s QRS duration 120 ms with a nonspecific IVCD QTc 440 ms    Assessment/plan:  History as above.  The patient is doing quite well with permanent atrial fibrillation.  I will continue rate control approach with anticoagulation.  He knows to call me if his heart rate drops even lower or if he has symptoms.    Problem List Items Addressed This Visit       Persistent atrial fibrillation (Multi) - Primary    Relevant Orders    ECG 12 lead (Clinic Performed)

## 2024-07-18 NOTE — ASSESSMENT & PLAN NOTE
Clinic Care Coordination Contact  Program: [facility]  County: [facility] Case #:  [facility] Worker:   Brittany #:   Subscriber #:   Renewal:  Date Applied: 23    FRW Outreach:   23- FRW called pt and we called hc together as he hadn't heard from his application. After being on hold for an 1hr, the [facility] rep said that pt application was denied because he fail to complete the interview. FRW offered to re-apply but pt state that he doesn't have his son's info.  FRW schedule an appt on  at 11am.    23- frw spoke with pt, he said that he hasn't heard or received anything from the [facility]. I advised that we call the county before reapply to check on the status of the application. FRW set up an appt on 23 at 9am.     23- frw called pt to follow on his application status, no answer left a vm. frw will follow up in a week.     23- FRW establish contact with pt and confirmed program. Patient was ready to apply, FRW submitted SNAP and cash assistance application. Pt was given instruction on what to expect next as well as HC phone number. FRW  Will follow up with pt in 4 weeks.     SNAP/CASH Application Screenin. Have you had [facility] benefits before?No  2. How many people in the household, do you eat/buy food together? 2  3. What is your monthly income (include all tax members)? 2600 monthly - 1300 biweekly  4. Do you have a bank account? Yes  5. Do you have any utility bills (electricity, rent, mortgage, phone, insurance, medical bills, etc.)? Yes rent  6. Do you have social security cards and/or green cards? Green card        Health Insurance:      Referral/Screening:  Financial Resource Worker Screening  [facility] Benefits  Is patient requesting help applying for [facility] benefits?: Yes  Have you recently applied for any [facility] benefits?: No  How many people in your household?: 2  Do you buy/eat food together?: Yes  What is the monthly gross income for the household (wages, social security,  History as above.  The patient is doing quite well with permanent atrial fibrillation.  I will continue rate control approach with anticoagulation.  He knows to call me if his heart rate drops even lower or if he has symptoms.   workers comp, and pension)? : 1000     Insurance:  [place] insurance?: No  Is this an insurance renewal?: No  Is this a new insurance application request?: No

## 2024-08-21 ENCOUNTER — LAB (OUTPATIENT)
Dept: LAB | Facility: LAB | Age: 79
End: 2024-08-21
Payer: MEDICARE

## 2024-08-21 DIAGNOSIS — E11.22 TYPE 2 DIABETES MELLITUS WITH STAGE 2 CHRONIC KIDNEY DISEASE, WITHOUT LONG-TERM CURRENT USE OF INSULIN (MULTI): ICD-10-CM

## 2024-08-21 DIAGNOSIS — I10 ESSENTIAL HYPERTENSION: ICD-10-CM

## 2024-08-21 DIAGNOSIS — N18.2 TYPE 2 DIABETES MELLITUS WITH STAGE 2 CHRONIC KIDNEY DISEASE, WITHOUT LONG-TERM CURRENT USE OF INSULIN (MULTI): ICD-10-CM

## 2024-08-21 DIAGNOSIS — Z87.39 HISTORY OF GOUT: ICD-10-CM

## 2024-08-21 LAB
ANION GAP SERPL CALC-SCNC: 9 MMOL/L
BUN SERPL-MCNC: 35 MG/DL (ref 8–25)
CALCIUM SERPL-MCNC: 9.2 MG/DL (ref 8.5–10.4)
CHLORIDE SERPL-SCNC: 108 MMOL/L (ref 97–107)
CO2 SERPL-SCNC: 24 MMOL/L (ref 24–31)
CREAT SERPL-MCNC: 1.7 MG/DL (ref 0.4–1.6)
CREAT UR-MCNC: 77.3 MG/DL
EGFRCR SERPLBLD CKD-EPI 2021: 41 ML/MIN/1.73M*2
GLUCOSE SERPL-MCNC: 109 MG/DL (ref 65–99)
MICROALBUMIN UR-MCNC: <12 MG/L (ref 0–23)
MICROALBUMIN/CREAT UR: NORMAL MG/G{CREAT}
POTASSIUM SERPL-SCNC: 4.8 MMOL/L (ref 3.4–5.1)
SODIUM SERPL-SCNC: 141 MMOL/L (ref 133–145)
URATE SERPL-MCNC: 4 MG/DL (ref 3.6–7.7)

## 2024-08-21 PROCEDURE — 36415 COLL VENOUS BLD VENIPUNCTURE: CPT

## 2024-08-21 PROCEDURE — 80048 BASIC METABOLIC PNL TOTAL CA: CPT

## 2024-08-21 PROCEDURE — 82043 UR ALBUMIN QUANTITATIVE: CPT

## 2024-08-21 PROCEDURE — 82570 ASSAY OF URINE CREATININE: CPT

## 2024-08-21 PROCEDURE — 84550 ASSAY OF BLOOD/URIC ACID: CPT

## 2024-08-22 DIAGNOSIS — N18.32 STAGE 3B CHRONIC KIDNEY DISEASE (MULTI): Primary | ICD-10-CM

## 2024-08-23 ENCOUNTER — TELEPHONE (OUTPATIENT)
Dept: PRIMARY CARE | Facility: CLINIC | Age: 79
End: 2024-08-23
Payer: MEDICARE

## 2024-08-23 DIAGNOSIS — N18.32 STAGE 3B CHRONIC KIDNEY DISEASE (MULTI): Primary | ICD-10-CM

## 2024-08-23 NOTE — TELEPHONE ENCOUNTER
Called and spoke with pt, in regards to his lab results. Pt verbally understands. Pt declined the referral to the nephrologist in [place], as its too far for him. Pt would like to see someone closer to home.

## 2024-08-23 NOTE — TELEPHONE ENCOUNTER
New referral placed. Also, INR has been trending up the past few readings. Any dietary changes, supplement changes, alcohol, etc that could be causing change in readings?

## 2024-08-26 NOTE — TELEPHONE ENCOUNTER
Pt called back and stated he has not done any of those changes recently and gave referral information

## 2024-08-29 NOTE — TELEPHONE ENCOUNTER
Tried calling pt but line was busy. Can you please reach out to him and ask him for most recent INR readings? Did he change his dose of carbamazepine recently?

## 2024-08-30 NOTE — TELEPHONE ENCOUNTER
Patient called to report an INR of 2.6.   He is now taking 5 mg of carbamazepine five days a week, and 4 mg of carbamazepine, two days a week.

## 2024-08-30 NOTE — TELEPHONE ENCOUNTER
Called and spoke with pt's wife, who state that pt is currently asleep, however she knows that pt's recent INR reading is around 2.3. Pt will do his INR today and will call back with reading.

## 2024-09-03 ENCOUNTER — TELEPHONE (OUTPATIENT)
Dept: PRIMARY CARE | Facility: CLINIC | Age: 79
End: 2024-09-03
Payer: MEDICARE

## 2024-09-03 NOTE — TELEPHONE ENCOUNTER
Patient called to give an FYI that he made an appointment with Dr. Albarran for his Kidneys.    [Hearing Loss] : hearing loss [Heartburn] : heartburn [Joint Pain] : joint pain [Negative] : Heme/Lymph [Earache] : no earache [Nosebleeds] : no nosebleeds [Postnasal Drip] : no postnasal drip [Nasal Discharge] : no nasal discharge [Sore Throat] : no sore throat [Hoarseness] : no hoarseness [Joint Stiffness] : no joint stiffness [Muscle Pain] : no muscle pain [Muscle Weakness] : no muscle weakness [Back Pain] : no back pain [Joint Swelling] : no joint swelling [FreeTextEntry4] : as per HPI [FreeTextEntry5] : edema

## 2024-09-05 ENCOUNTER — LAB (OUTPATIENT)
Dept: LAB | Facility: LAB | Age: 79
End: 2024-09-05
Payer: MEDICARE

## 2024-09-05 ENCOUNTER — APPOINTMENT (OUTPATIENT)
Dept: UROLOGY | Facility: CLINIC | Age: 79
End: 2024-09-05
Payer: MEDICARE

## 2024-09-05 DIAGNOSIS — N40.1 BENIGN LOCALIZED PROSTATIC HYPERPLASIA WITH LOWER URINARY TRACT SYMPTOMS (LUTS): ICD-10-CM

## 2024-09-05 DIAGNOSIS — R97.20 ELEVATED PSA: ICD-10-CM

## 2024-09-05 PROCEDURE — 84153 ASSAY OF PSA TOTAL: CPT

## 2024-09-05 PROCEDURE — 99204 OFFICE O/P NEW MOD 45 MIN: CPT | Performed by: STUDENT IN AN ORGANIZED HEALTH CARE EDUCATION/TRAINING PROGRAM

## 2024-09-05 PROCEDURE — 84154 ASSAY OF PSA FREE: CPT

## 2024-09-05 PROCEDURE — 87086 URINE CULTURE/COLONY COUNT: CPT

## 2024-09-05 PROCEDURE — 1123F ACP DISCUSS/DSCN MKR DOCD: CPT | Performed by: STUDENT IN AN ORGANIZED HEALTH CARE EDUCATION/TRAINING PROGRAM

## 2024-09-05 PROCEDURE — 81001 URINALYSIS AUTO W/SCOPE: CPT

## 2024-09-05 PROCEDURE — 1159F MED LIST DOCD IN RCRD: CPT | Performed by: STUDENT IN AN ORGANIZED HEALTH CARE EDUCATION/TRAINING PROGRAM

## 2024-09-05 PROCEDURE — 36415 COLL VENOUS BLD VENIPUNCTURE: CPT

## 2024-09-05 PROCEDURE — 1157F ADVNC CARE PLAN IN RCRD: CPT | Performed by: STUDENT IN AN ORGANIZED HEALTH CARE EDUCATION/TRAINING PROGRAM

## 2024-09-05 PROCEDURE — G2211 COMPLEX E/M VISIT ADD ON: HCPCS | Performed by: STUDENT IN AN ORGANIZED HEALTH CARE EDUCATION/TRAINING PROGRAM

## 2024-09-05 RX ORDER — LISINOPRIL 20 MG/1
20 TABLET ORAL DAILY
COMMUNITY

## 2024-09-05 NOTE — PROGRESS NOTES
Subjective   Patient ID: Hunter Gorman is a 79 y.o. male who presents for LUTS, elevated PSA.  HPI  79 y.o. male who presents for LUTS, elevated PSA. Was kindly referred by Anitra Roman PA-C. Previously seen by Dr. Dsouza. Last PSA 4.9.     He has minimal LUTS, he is mostly satisfied. On no medication at this point, is comfortable with this.     Lab Results   Component Value Date    PSA 4.9 (H) 07/20/2022    PSA 3.3 07/12/2019       Review of Systems  A complete review of systems was performed. All systems are noted to be negative unless indicated in the history of present illness, impression, active problem list, or past histories.     Objective   Physical Exam  General: Well developed, well nourished, alert and cooperative, appears in no acute distress  Eyes: Non-injected conjunctiva, sclera clear, no proptosis  Cardiac: Extremities are warm and well perfused. No edema, cyanosis or pallor.   Lungs: Breathing is easy, non-labored. Speaking in clear and complete sentences. Normal diaphragmatic movement.  Abdomen: soft, non-tender  MSK: Ambulatory with steady gait, unassisted  Neuro: alert and oriented to person, place and time  Psych: Demonstrates good judgement and reason, without hallucinations, abnormal affect or abnormal behaviors.  Skin: no obvious lesions, no rashes.  : phallus circumcised, normal in size, no plaques or lesions. Testicles normal in size and texture, no masses  WARREN: prostate enlarged, nodule at apex of prostate.     Assessment/Plan   79 y.o. male who presents for LUTS, elevated PSA. Was kindly referred by Anitra Roman PA-C. Previously seen by Dr. Dsouza. Last PSA 4.9.     He has minimal LUTS, he is mostly satisfied. On no medication at this point, is comfortable with this.     I reviewed with the patient the value and significance of a rising PSA, and the role in screening and early detection of prostate cancer. I explained that PSA is prostate specific, yet not prostate  cancer specific, and typical etiology for the rise of PSA include UTI, prostate enlargement, prostatic inflammation such as a bacterial prostatitis, urinary retention, and prostate cancer. To narrow our differential diagnosis, we rule out urinary infection and retention, and perform a prostate MRI which will give us more information about the size of the prostate, possible inflammation, and suspicious lesions which will serve as targets for MRI/Ultrasound guided fusion targeted biopsy of the prostate.  Positive MRI will necessitate a biopsy of the indicated lesions, whereas a negative MRI will be interpreted in the context of the clinical suspicion, which includes the PSA velocity (speed of rise of PSA), PSA density, age of the patient, and positive family history for prostate cancer or even breast cancer.  I explained to him that prostate cancer is the most common malignancy in men, however it is not the most common cancer killer for several reasons. These are related to the fact that prostate cancer is mostly not aggressive, slow in it growth, progression, and recurrence. In a lot of men, prostate cancer is not diagnosed and still does not result in cancer-related death, and as such active surveillance has become an accepted management option in low-grade low-stage low-volume prostate cancer. A proportion of men with prostate cancer still require treatment in order to prevent progression and metastasis, and some men might require a multidisciplinary management including different combinations of surgery, radiation, and systemic therapy. In order to better diagnose and decide on treatment options in prostate cancer, proper diagnosis should be performed and shared decision making between the physician and the patient is key at that point.    The patient understands the overall situation, and is willing to proceed with the plan starting with urine testing followed by MRI of the prostate.    Plan:  Urinalysis and  urine culture  PSA f/t  MRI of the prostate  FUV 1 month.     Diagnoses and all orders for this visit:  Benign localized prostatic hyperplasia with lower urinary tract symptoms (LUTS)  -     Referral to Urology  -     Urine Culture; Future  -     Urinalysis with Reflex Microscopic; Future  Elevated PSA  -     PSA, Total and Free; Future  -     MR prostate with peter boundaries if pirads 3 or above; Future    Scribe Attestation  By signing my name below, I, Johnnie Simon attest that this documentation has been prepared under the direction and in the presence of Misha Ramon MD.

## 2024-09-05 NOTE — TELEPHONE ENCOUNTER
Called and spoke with Moses, who will relay message to pt. Pt will call tomorrow with INR reading as well.

## 2024-09-06 LAB
APPEARANCE UR: CLEAR
BACTERIA UR CULT: NO GROWTH
BILIRUB UR STRIP.AUTO-MCNC: NEGATIVE MG/DL
COLOR UR: YELLOW
GLUCOSE UR STRIP.AUTO-MCNC: NORMAL MG/DL
KETONES UR STRIP.AUTO-MCNC: NEGATIVE MG/DL
LEUKOCYTE ESTERASE UR QL STRIP.AUTO: ABNORMAL
NITRITE UR QL STRIP.AUTO: NEGATIVE
PH UR STRIP.AUTO: 5 [PH]
PROT UR STRIP.AUTO-MCNC: NEGATIVE MG/DL
RBC # UR STRIP.AUTO: NEGATIVE /UL
RBC #/AREA URNS AUTO: NORMAL /HPF
SP GR UR STRIP.AUTO: 1.02
UROBILINOGEN UR STRIP.AUTO-MCNC: NORMAL MG/DL
WBC #/AREA URNS AUTO: NORMAL /HPF

## 2024-09-08 LAB
PSA FREE MFR SERPL: 16 %
PSA FREE SERPL-MCNC: 1 NG/ML
PSA SERPL IA-MCNC: 6.4 NG/ML (ref 0–4)

## 2024-09-13 DIAGNOSIS — E78.2 MIXED HYPERLIPIDEMIA: ICD-10-CM

## 2024-09-13 RX ORDER — LOVASTATIN 40 MG/1
80 TABLET ORAL DAILY
Qty: 90 TABLET | Refills: 1 | Status: SHIPPED | OUTPATIENT
Start: 2024-09-13

## 2024-09-17 ENCOUNTER — TELEPHONE (OUTPATIENT)
Dept: UROLOGY | Facility: CLINIC | Age: 79
End: 2024-09-17
Payer: MEDICARE

## 2024-09-17 NOTE — TELEPHONE ENCOUNTER
Patient call for psa results and decided to not go through with mri or make a follow up appointment. He said maybe he will follow up in a year. I explain the risk factors for not follow ing up about the increase in the psa and he stated he would think about it and call back.

## 2024-10-04 ENCOUNTER — LAB (OUTPATIENT)
Dept: LAB | Facility: LAB | Age: 79
End: 2024-10-04
Payer: MEDICARE

## 2024-10-04 DIAGNOSIS — N18.30 CHRONIC KIDNEY DISEASE, STAGE 3 UNSPECIFIED (MULTI): Primary | ICD-10-CM

## 2024-10-04 LAB
ALBUMIN SERPL BCP-MCNC: 4.2 G/DL (ref 3.4–5)
ANION GAP SERPL CALCULATED.3IONS-SCNC: 9 MMOL/L (ref 10–20)
APPEARANCE UR: CLEAR
BASOPHILS # BLD AUTO: 0.15 X10*3/UL (ref 0–0.1)
BASOPHILS NFR BLD AUTO: 1.7 %
BILIRUB UR STRIP.AUTO-MCNC: NEGATIVE MG/DL
BUN SERPL-MCNC: 24 MG/DL (ref 6–23)
CALCIUM SERPL-MCNC: 9.2 MG/DL (ref 8.6–10.3)
CHLORIDE SERPL-SCNC: 109 MMOL/L (ref 98–107)
CO2 SERPL-SCNC: 27 MMOL/L (ref 21–32)
COLOR UR: ABNORMAL
CREAT SERPL-MCNC: 1.42 MG/DL (ref 0.5–1.3)
CREAT UR-MCNC: 83.8 MG/DL (ref 20–370)
EGFRCR SERPLBLD CKD-EPI 2021: 51 ML/MIN/1.73M*2
EOSINOPHIL # BLD AUTO: 0.77 X10*3/UL (ref 0–0.4)
EOSINOPHIL NFR BLD AUTO: 8.6 %
ERYTHROCYTE [DISTWIDTH] IN BLOOD BY AUTOMATED COUNT: 14.6 % (ref 11.5–14.5)
GLUCOSE SERPL-MCNC: 106 MG/DL (ref 74–99)
GLUCOSE UR STRIP.AUTO-MCNC: NORMAL MG/DL
HCT VFR BLD AUTO: 42.4 % (ref 41–52)
HGB BLD-MCNC: 14 G/DL (ref 13.5–17.5)
HOLD SPECIMEN: NORMAL
IMM GRANULOCYTES # BLD AUTO: 0.04 X10*3/UL (ref 0–0.5)
IMM GRANULOCYTES NFR BLD AUTO: 0.4 % (ref 0–0.9)
KETONES UR STRIP.AUTO-MCNC: NEGATIVE MG/DL
LEUKOCYTE ESTERASE UR QL STRIP.AUTO: NEGATIVE
LYMPHOCYTES # BLD AUTO: 2.38 X10*3/UL (ref 0.8–3)
LYMPHOCYTES NFR BLD AUTO: 26.7 %
MCH RBC QN AUTO: 32.6 PG (ref 26–34)
MCHC RBC AUTO-ENTMCNC: 33 G/DL (ref 32–36)
MCV RBC AUTO: 99 FL (ref 80–100)
MONOCYTES # BLD AUTO: 1.01 X10*3/UL (ref 0.05–0.8)
MONOCYTES NFR BLD AUTO: 11.3 %
NEUTROPHILS # BLD AUTO: 4.57 X10*3/UL (ref 1.6–5.5)
NEUTROPHILS NFR BLD AUTO: 51.3 %
NITRITE UR QL STRIP.AUTO: NEGATIVE
NRBC BLD-RTO: 0 /100 WBCS (ref 0–0)
PH UR STRIP.AUTO: 5 [PH]
PHOSPHATE SERPL-MCNC: 3 MG/DL (ref 2.5–4.9)
PLATELET # BLD AUTO: 150 X10*3/UL (ref 150–450)
POTASSIUM SERPL-SCNC: 4.4 MMOL/L (ref 3.5–5.3)
PROT UR STRIP.AUTO-MCNC: NEGATIVE MG/DL
PROT UR-ACNC: 11 MG/DL (ref 5–25)
PTH-INTACT SERPL-MCNC: 59.5 PG/ML (ref 18.5–88)
RBC # BLD AUTO: 4.3 X10*6/UL (ref 4.5–5.9)
RBC # UR STRIP.AUTO: ABNORMAL /UL
RBC #/AREA URNS AUTO: NORMAL /HPF
SODIUM SERPL-SCNC: 141 MMOL/L (ref 136–145)
SP GR UR STRIP.AUTO: 1.01
UROBILINOGEN UR STRIP.AUTO-MCNC: NORMAL MG/DL
WBC # BLD AUTO: 8.9 X10*3/UL (ref 4.4–11.3)
WBC #/AREA URNS AUTO: NORMAL /HPF

## 2024-10-04 PROCEDURE — 83970 ASSAY OF PARATHORMONE: CPT

## 2024-10-04 PROCEDURE — 82570 ASSAY OF URINE CREATININE: CPT

## 2024-10-04 PROCEDURE — 86803 HEPATITIS C AB TEST: CPT

## 2024-10-04 PROCEDURE — 84156 ASSAY OF PROTEIN URINE: CPT

## 2024-10-04 PROCEDURE — 36415 COLL VENOUS BLD VENIPUNCTURE: CPT

## 2024-10-04 PROCEDURE — 86334 IMMUNOFIX E-PHORESIS SERUM: CPT

## 2024-10-04 PROCEDURE — 86335 IMMUNFIX E-PHORSIS/URINE/CSF: CPT

## 2024-10-04 PROCEDURE — 84165 PROTEIN E-PHORESIS SERUM: CPT

## 2024-10-04 PROCEDURE — 85025 COMPLETE CBC W/AUTO DIFF WBC: CPT

## 2024-10-04 PROCEDURE — 81001 URINALYSIS AUTO W/SCOPE: CPT

## 2024-10-04 PROCEDURE — 84155 ASSAY OF PROTEIN SERUM: CPT

## 2024-10-04 PROCEDURE — 82306 VITAMIN D 25 HYDROXY: CPT

## 2024-10-04 PROCEDURE — 80069 RENAL FUNCTION PANEL: CPT

## 2024-10-04 PROCEDURE — 84166 PROTEIN E-PHORESIS/URINE/CSF: CPT

## 2024-10-05 LAB
25(OH)D3 SERPL-MCNC: 50 NG/ML (ref 30–100)
HCV AB SER QL: NONREACTIVE
PROT SERPL-MCNC: 6.2 G/DL (ref 6.4–8.2)

## 2024-10-09 ENCOUNTER — HOSPITAL ENCOUNTER (OUTPATIENT)
Dept: RADIOLOGY | Facility: CLINIC | Age: 79
End: 2024-10-09
Payer: MEDICARE

## 2024-10-09 ENCOUNTER — HOSPITAL ENCOUNTER (OUTPATIENT)
Dept: RADIOLOGY | Facility: HOSPITAL | Age: 79
Discharge: HOME | End: 2024-10-09
Payer: MEDICARE

## 2024-10-09 DIAGNOSIS — N18.30: ICD-10-CM

## 2024-10-09 LAB
ALBUMIN MFR UR ELPH: 34.7 %
ALBUMIN: 3.9 G/DL (ref 3.4–5)
ALPHA 1 GLOBULIN: 0.3 G/DL (ref 0.2–0.6)
ALPHA 2 GLOBULIN: 0.7 G/DL (ref 0.4–1.1)
ALPHA1 GLOB MFR UR ELPH: 11.3 %
ALPHA2 GLOB MFR UR ELPH: 20.7 %
B-GLOBULIN MFR UR ELPH: 20.6 %
BETA GLOBULIN: 0.7 G/DL (ref 0.5–1.2)
GAMMA GLOB MFR UR ELPH: 12.7 %
GAMMA GLOBULIN: 0.7 G/DL (ref 0.5–1.4)
IMMUNOFIXATION COMMENT: NORMAL
IMMUNOFIXATION COMMENT: NORMAL
PATH REVIEW - SERUM IMMUNOFIXATION: NORMAL
PATH REVIEW - URINE IMMUNOFIXATION: NORMAL
PATH REVIEW-SERUM PROTEIN ELECTROPHORESIS: NORMAL
PATH REVIEW-URINE PROTEIN ELECTROPHORESIS: NORMAL
PROTEIN ELECTROPHORESIS COMMENT: NORMAL
URINE ELECTROPHORESIS COMMENT: NORMAL

## 2024-10-09 PROCEDURE — 76770 US EXAM ABDO BACK WALL COMP: CPT

## 2024-10-09 PROCEDURE — 76770 US EXAM ABDO BACK WALL COMP: CPT | Performed by: RADIOLOGY

## 2024-10-17 ENCOUNTER — APPOINTMENT (OUTPATIENT)
Dept: OPHTHALMOLOGY | Facility: CLINIC | Age: 79
End: 2024-10-17
Payer: MEDICARE

## 2024-10-17 DIAGNOSIS — H02.889 MEIBOMIAN GLAND DYSFUNCTION: ICD-10-CM

## 2024-10-17 DIAGNOSIS — I10 ESSENTIAL HYPERTENSION: ICD-10-CM

## 2024-10-17 DIAGNOSIS — N18.2 TYPE 2 DIABETES MELLITUS WITH STAGE 2 CHRONIC KIDNEY DISEASE, WITHOUT LONG-TERM CURRENT USE OF INSULIN (MULTI): Primary | ICD-10-CM

## 2024-10-17 DIAGNOSIS — D31.32 BENIGN NEOPLASM OF CHOROID OF LEFT EYE: ICD-10-CM

## 2024-10-17 DIAGNOSIS — E11.22 TYPE 2 DIABETES MELLITUS WITH STAGE 2 CHRONIC KIDNEY DISEASE, WITHOUT LONG-TERM CURRENT USE OF INSULIN (MULTI): Primary | ICD-10-CM

## 2024-10-17 DIAGNOSIS — Z96.1 PSEUDOPHAKIA OF BOTH EYES: ICD-10-CM

## 2024-10-17 DIAGNOSIS — H52.7 REFRACTIVE ERROR: ICD-10-CM

## 2024-10-17 PROCEDURE — 99214 OFFICE O/P EST MOD 30 MIN: CPT | Performed by: OPHTHALMOLOGY

## 2024-10-17 ASSESSMENT — VISUAL ACUITY
OD_SC+: -2
OD_SC: 20/25
OD_CC: 20/40
OS_SC+: +1
OS_CC+: -2
OS_SC: 20/30
OS_CC: 20/25
METHOD: SNELLEN - SINGLE

## 2024-10-17 ASSESSMENT — EXTERNAL EXAM - LEFT EYE: OS_EXAM: NORMAL

## 2024-10-17 ASSESSMENT — PATIENT HEALTH QUESTIONNAIRE - PHQ9
SUM OF ALL RESPONSES TO PHQ9 QUESTIONS 1 AND 2: 0
2. FEELING DOWN, DEPRESSED OR HOPELESS: NOT AT ALL
1. LITTLE INTEREST OR PLEASURE IN DOING THINGS: NOT AT ALL

## 2024-10-17 ASSESSMENT — REFRACTION_WEARINGRX
OD_ADD: +2.75
OS_ADD: +2.75
OS_SPHERE: +1.00
OD_AXIS: 114
OD_SPHERE: +0.75
OS_AXIS: 091
SPECS_TYPE: BIFOCAL
OD_CYLINDER: -0.50
OS_CYLINDER: -0.50

## 2024-10-17 ASSESSMENT — ENCOUNTER SYMPTOMS
PSYCHIATRIC NEGATIVE: 0
NEUROLOGICAL NEGATIVE: 0
MUSCULOSKELETAL NEGATIVE: 0
HEMATOLOGIC/LYMPHATIC NEGATIVE: 0
GASTROINTESTINAL NEGATIVE: 0
ENDOCRINE NEGATIVE: 0
CONSTITUTIONAL NEGATIVE: 0
RESPIRATORY NEGATIVE: 0
ALLERGIC/IMMUNOLOGIC NEGATIVE: 0
EYES NEGATIVE: 0
CARDIOVASCULAR NEGATIVE: 0

## 2024-10-17 ASSESSMENT — REFRACTION_MANIFEST
OS_AXIS: 090
OD_CYLINDER: -1.75
OD_AXIS: 100
OS_CYLINDER: -1.25
OS_SPHERE: +0.50
METHOD_AUTOREFRACTION: 1
OD_SPHERE: +0.50

## 2024-10-17 ASSESSMENT — PAIN SCALES - GENERAL: PAINLEVEL_OUTOF10: 0-NO PAIN

## 2024-10-17 ASSESSMENT — KERATOMETRY
OS_AXISANGLE_DEGREES: 90
OD_AXISANGLE2_DEGREES: 105
OD_AXISANGLE_DEGREES: 15
OS_AXISANGLE2_DEGREES: 180
METHOD_AUTO_MANUAL: AUTOMATED
OS_K1POWER_DIOPTERS: 44.75
OD_K2POWER_DIOPTERS: 45.00
OS_K2POWER_DIOPTERS: 44.75
OD_K1POWER_DIOPTERS: 44.25

## 2024-10-17 ASSESSMENT — CUP TO DISC RATIO
OS_RATIO: 0.15
OD_RATIO: 0.3

## 2024-10-17 ASSESSMENT — TONOMETRY
IOP_METHOD: GOLDMANN APPLANATION
OD_IOP_MMHG: 15
OS_IOP_MMHG: 16

## 2024-10-17 ASSESSMENT — EXTERNAL EXAM - RIGHT EYE: OD_EXAM: NORMAL

## 2024-10-17 NOTE — LETTER
October 17, 2024    Anitra Roman PA-C  [address]  [place] [address]    Patient: Hunter Gorman   YOB: 1945   Date of Visit: 10/17/2024       Dear Dr. Anitra Roman PA-C:    Thank you for referring Hunter Gorman to me for evaluation. Here is my assessment and plan of care:    Assessment/Plan:  Hunter was seen today for annual exam.  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  Meibomian gland dysfunction  Refractive error    Right eye:     Left eye:    [x] No diabetes mellitus (DM) retinopathy [x] No diabetes mellitus (DM) retinopathy    [] Mild non-proliferative retinopathy [] Mild non-proliferative retinopathy    [] Moderate non-proliferative retinopathy [] Moderate non-proliferative retinopathy    [] Severe non-proliferative retinopathy [] Severe non-proliferative retinopathy    [] Proliferative retinopathy   [] Proliferative retinopathy    [] Diabetic macular edema   [] Diabetic macular edema    Urged patient to continue to work on best blood sugar and blood pressure control  Advised patient to call if any new vision changes noted    Will plan to repeat evaluation in:   [] 3 months [] 6 months [] 9 months [x] 12 months [] Other    Below you can find relevant pieces of the exam. If you have questions, please do not hesitate to call me. I look forward to following Hunter along with you.         Sincerely,        Toro Omalley MD        CC:   No Recipients         External Exam         Right Left    External Normal Normal              Slit Lamp Exam         Right Left    Lids/Lashes 3+ Meibomian gland dysfunction, Collarettes 3+ Meibomian gland dysfunction, Collarettes    Conjunctiva/Sclera White and quiet White and quiet    Cornea Clear Clear    Anterior Chamber Deep and quiet  "Deep and quiet    Iris Round and reactive Round and reactive    Lens posterior chamber IOL posterior chamber IOL              Fundus Exam         Right Left    Vitreous Normal Normal    Disc Normal Normal    C/D Ratio 0.3 0.15    Macula Normal Normal    Vessels Normal Normal    Periphery Normal no proliferative diabetic retinopathy. choroidal nevus 1dd, inferior off arcade, flat, no subretinal fluid (SRF)                   <div id=\"MAIN_[email or web address]\"></div>     "

## 2024-10-17 NOTE — ASSESSMENT & PLAN NOTE
Advised while symptoms controlled still shows significant amount of glandular dysfunction, would recommend more regular lid hygiene measures.

## 2024-10-17 NOTE — PROGRESS NOTES
Assessment/Plan   Problem List Items Addressed This Visit       Pseudophakia of both eyes     Stable intraocular lens (IOL) both eyes (OU) without any significant PCO, will monitor with serial exam.          Type 2 diabetes mellitus with stage 2 chronic kidney disease, without long-term current use of insulin (Multi) - Primary     Advised no signs of diabetic changes on exam. Recommend to continue best sugar control and on need for annual checkup. Understands role of good BP control and weight loss if applicable. Discussed some components of selected studies like WESDR, DCCT, and UKPDS to describe the likely course of diabetes and effects on the eyes.           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         Meibomian gland dysfunction     Advised while symptoms controlled still shows significant amount of glandular dysfunction, would recommend more regular lid hygiene measures.          Refractive error     Refraction performed today for diagnostic purposes only and without specific intent to dispense Rx. Glasses/SCL Rx not dispensed today.               Provided reassurance regarding above diagnoses and care received in the office visit today. Discussed outcomes and options along with the importance of treatment compliance. Understands the importance of any follow up visits. Patient instructed to call/communicate with our office if any new issues, questions, or concerns.     Will plan to see back in 1 year full or sooner PRN

## 2024-10-17 NOTE — ASSESSMENT & PLAN NOTE
Refraction performed today for diagnostic purposes only and without specific intent to dispense Rx. Glasses/SCL Rx not dispensed today.

## 2024-10-18 RX ORDER — METOPROLOL TARTRATE 50 MG/1
50 TABLET ORAL 2 TIMES DAILY
Qty: 180 TABLET | Refills: 1 | Status: SHIPPED | OUTPATIENT
Start: 2024-10-18

## 2024-10-22 DIAGNOSIS — I10 ESSENTIAL HYPERTENSION: ICD-10-CM

## 2024-10-22 RX ORDER — AMLODIPINE BESYLATE 5 MG/1
5 TABLET ORAL DAILY
Qty: 90 TABLET | Refills: 3 | Status: SHIPPED | OUTPATIENT
Start: 2024-10-22

## 2024-10-25 DIAGNOSIS — I10 ESSENTIAL HYPERTENSION: Primary | ICD-10-CM

## 2024-10-25 RX ORDER — LISINOPRIL 20 MG/1
20 TABLET ORAL DAILY
Qty: 90 TABLET | Refills: 1 | Status: SHIPPED | OUTPATIENT
Start: 2024-10-25

## 2024-11-07 ENCOUNTER — TELEPHONE (OUTPATIENT)
Dept: PRIMARY CARE | Facility: CLINIC | Age: 79
End: 2024-11-07
Payer: MEDICARE

## 2024-11-07 DIAGNOSIS — G50.0 TRIGEMINAL NEURALGIA OF RIGHT SIDE OF FACE: ICD-10-CM

## 2024-11-07 DIAGNOSIS — M1A.9XX0 CHRONIC GOUT WITHOUT TOPHUS, UNSPECIFIED CAUSE, UNSPECIFIED SITE: Primary | ICD-10-CM

## 2024-11-07 RX ORDER — ALLOPURINOL 300 MG/1
300 TABLET ORAL DAILY
Qty: 90 TABLET | Refills: 3 | Status: SHIPPED | OUTPATIENT
Start: 2024-11-07 | End: 2025-11-07

## 2024-11-07 RX ORDER — CARBAMAZEPINE 100 MG/1
200 TABLET, CHEWABLE ORAL DAILY
Qty: 180 TABLET | Refills: 1 | Status: SHIPPED | OUTPATIENT
Start: 2024-11-07 | End: 2025-05-06

## 2024-11-07 NOTE — TELEPHONE ENCOUNTER
I sent rx for 300mg dose allopurinol. Can you get clarification on his carbamazepine dose before I send it? I have a note in here that he takes 5mg 5x/week and 4mg 2x/week but that doesn't make sense because dosing is 100s.

## 2024-11-07 NOTE — TELEPHONE ENCOUNTER
"Called and spoke with pt in regards to his carBAMazepine. Pt states that currently he is taking 100mg tablets, 2 tabs per day. However pt states that he \"plays around with the dosages,\" due to wanting to wean himself off. However, while he is weaning himself off, pt has a gout flare up and will go up in the dosages again for a few days.   "

## 2024-11-07 NOTE — TELEPHONE ENCOUNTER
Last seen: 07/12/24  Next appt: 01/23/25    carBAMazepine (TEGretol) 50 mg chewable split tablet   >He's also requesting Allpurinol - Do not see on med list

## 2024-12-02 ENCOUNTER — TELEPHONE (OUTPATIENT)
Dept: PRIMARY CARE | Facility: CLINIC | Age: 79
End: 2024-12-02
Payer: MEDICARE

## 2024-12-02 NOTE — TELEPHONE ENCOUNTER
Can take 200mg in the morning and 200mg at bedtime, but should not be altering dose on his own. If he wants to be on this medication, he needs to be regular with the dosing because there are side effects that can happen with rapid dose withdrawal. If this is not helping, he will need to follow up with neurology and I can place a referral for him.

## 2024-12-02 NOTE — TELEPHONE ENCOUNTER
Patient has been taking Tegrol for 3 weeks and he said its no helping him at all. He is wanting to know if he can increase dose or have another medication called in?

## 2024-12-16 DIAGNOSIS — I10 ESSENTIAL HYPERTENSION: Primary | ICD-10-CM

## 2024-12-16 RX ORDER — FUROSEMIDE 20 MG/1
20 TABLET ORAL DAILY
Qty: 90 TABLET | Refills: 1 | Status: SHIPPED | OUTPATIENT
Start: 2024-12-16

## 2024-12-20 ENCOUNTER — OFFICE VISIT (OUTPATIENT)
Dept: PRIMARY CARE | Facility: CLINIC | Age: 79
End: 2024-12-20
Payer: MEDICARE

## 2024-12-20 VITALS
OXYGEN SATURATION: 95 % | HEIGHT: 67 IN | SYSTOLIC BLOOD PRESSURE: 122 MMHG | BODY MASS INDEX: 29.51 KG/M2 | WEIGHT: 188 LBS | DIASTOLIC BLOOD PRESSURE: 58 MMHG | HEART RATE: 72 BPM

## 2024-12-20 DIAGNOSIS — R68.83 CHILLS: ICD-10-CM

## 2024-12-20 DIAGNOSIS — R52 BODY ACHES: ICD-10-CM

## 2024-12-20 DIAGNOSIS — U07.1 COVID-19: Primary | ICD-10-CM

## 2024-12-20 LAB
POC BINAX EXPIRATION: ABNORMAL
POC BINAX NOW COVID SERIAL NUMBER: ABNORMAL
POC SARS-COV-2 AG BINAX: ABNORMAL

## 2024-12-20 PROCEDURE — 99213 OFFICE O/P EST LOW 20 MIN: CPT

## 2024-12-20 PROCEDURE — 87811 SARS-COV-2 COVID19 W/OPTIC: CPT

## 2024-12-20 PROCEDURE — 1159F MED LIST DOCD IN RCRD: CPT

## 2024-12-20 PROCEDURE — 3078F DIAST BP <80 MM HG: CPT

## 2024-12-20 PROCEDURE — 1157F ADVNC CARE PLAN IN RCRD: CPT

## 2024-12-20 PROCEDURE — 1126F AMNT PAIN NOTED NONE PRSNT: CPT

## 2024-12-20 PROCEDURE — 3074F SYST BP LT 130 MM HG: CPT

## 2024-12-20 PROCEDURE — 1036F TOBACCO NON-USER: CPT

## 2024-12-20 PROCEDURE — 1123F ACP DISCUSS/DSCN MKR DOCD: CPT

## 2024-12-20 RX ORDER — BENZONATATE 200 MG/1
200 CAPSULE ORAL 3 TIMES DAILY PRN
Qty: 30 CAPSULE | Refills: 0 | Status: SHIPPED | OUTPATIENT
Start: 2024-12-20 | End: 2025-01-19

## 2024-12-20 ASSESSMENT — ENCOUNTER SYMPTOMS
SINUS PRESSURE: 0
NAUSEA: 0
RHINORRHEA: 0
SORE THROAT: 1
DIARRHEA: 0
MYALGIAS: 1
HEADACHES: 1
VOMITING: 0
COUGH: 1
WHEEZING: 0
SINUS PAIN: 0
FEVER: 0
CHILLS: 1
FATIGUE: 1
SHORTNESS OF BREATH: 1

## 2024-12-20 ASSESSMENT — PAIN SCALES - GENERAL: PAINLEVEL_OUTOF10: 0-NO PAIN

## 2024-12-20 NOTE — PROGRESS NOTES
"Subjective   Patient ID: Hunter Gorman is a 79 y.o. male who presents for Sick Visit (Cough,chills,dizziness,fatigue,hard time sleeping. Ongoing since Wednesday. ).    URI sx x 3 days. Patient started with sore throat, Now having fatigue, body aches, headaches, chills, wheezing. Using Desylm and Ipotrpium nasal spray. Denies fever, SOB., nausua, vomiting, diarrhea, ear pain, sinus pressure.           Review of Systems   Constitutional:  Positive for chills and fatigue. Negative for fever.   HENT:  Positive for congestion and sore throat. Negative for ear pain, rhinorrhea, sinus pressure and sinus pain.    Respiratory:  Positive for cough and shortness of breath. Negative for wheezing.    Gastrointestinal:  Negative for diarrhea, nausea and vomiting.   Musculoskeletal:  Positive for myalgias.   Neurological:  Positive for headaches.       Objective   /58   Pulse 72   Ht 1.702 m (5' 7\")   Wt 85.3 kg (188 lb)   SpO2 95%   BMI 29.44 kg/m²     Physical Exam  Constitutional:       Appearance: Normal appearance.   HENT:      Right Ear: Tympanic membrane and ear canal normal.      Left Ear: Tympanic membrane and ear canal normal.      Nose: Congestion present. No rhinorrhea.      Right Turbinates: Enlarged and swollen. Not pale.      Left Turbinates: Enlarged and swollen. Not pale.      Right Sinus: No maxillary sinus tenderness or frontal sinus tenderness.      Left Sinus: No maxillary sinus tenderness or frontal sinus tenderness.      Mouth/Throat:      Mouth: Mucous membranes are moist. No oral lesions.      Pharynx: Uvula midline. Posterior oropharyngeal erythema present. No oropharyngeal exudate.   Eyes:      Conjunctiva/sclera: Conjunctivae normal.   Cardiovascular:      Rate and Rhythm: Normal rate and regular rhythm.      Heart sounds: No murmur heard.  Pulmonary:      Effort: Pulmonary effort is normal.      Breath sounds: Normal breath sounds. No wheezing, rhonchi or rales.   Lymphadenopathy:      " Cervical: No cervical adenopathy.   Skin:     General: Skin is warm and dry.   Neurological:      Mental Status: He is alert.   Psychiatric:         Mood and Affect: Mood and affect normal.         Assessment/Plan   Diagnoses and all orders for this visit:  COVID-19  -     benzonatate (Tessalon) 200 mg capsule; Take 1 capsule (200 mg) by mouth 3 times a day as needed for cough. Do not crush or chew.  Chills  -     POCT BinaxNOW Covid-19 Ag Card manually resulted  Body aches  -     POCT BinaxNOW Covid-19 Ag Card manually resulted  Patient on multiple medications that interact with Paxlovid therefor risk outweigh benefits. Went over supportive care and patient is to call if not improving, or if breathing worsens go to ER.

## 2024-12-30 DIAGNOSIS — E78.2 MIXED HYPERLIPIDEMIA: ICD-10-CM

## 2024-12-30 RX ORDER — LOVASTATIN 40 MG/1
TABLET ORAL
Qty: 90 TABLET | Refills: 3 | Status: SHIPPED | OUTPATIENT
Start: 2024-12-30

## 2025-01-03 DIAGNOSIS — G50.0 TRIGEMINAL NEURALGIA OF RIGHT SIDE OF FACE: Primary | ICD-10-CM

## 2025-01-13 ENCOUNTER — LAB (OUTPATIENT)
Dept: LAB | Facility: LAB | Age: 80
End: 2025-01-13
Payer: MEDICARE

## 2025-01-13 DIAGNOSIS — G50.0 TRIGEMINAL NEURALGIA: ICD-10-CM

## 2025-01-13 DIAGNOSIS — R20.2 PARESTHESIA OF SKIN: Primary | ICD-10-CM

## 2025-01-13 LAB
BUN SERPL-MCNC: 22 MG/DL (ref 6–23)
CREAT SERPL-MCNC: 1.39 MG/DL (ref 0.5–1.3)
EGFRCR SERPLBLD CKD-EPI 2021: 52 ML/MIN/1.73M*2

## 2025-01-13 PROCEDURE — 82565 ASSAY OF CREATININE: CPT

## 2025-01-13 PROCEDURE — 84520 ASSAY OF UREA NITROGEN: CPT

## 2025-01-15 ENCOUNTER — HOSPITAL ENCOUNTER (OUTPATIENT)
Dept: RADIOLOGY | Facility: CLINIC | Age: 80
Discharge: HOME | End: 2025-01-15
Payer: MEDICARE

## 2025-01-15 DIAGNOSIS — R20.2 PARESTHESIA OF SKIN: ICD-10-CM

## 2025-01-15 DIAGNOSIS — G50.0 TRIGEMINAL NEURALGIA: ICD-10-CM

## 2025-01-15 PROCEDURE — 70553 MRI BRAIN STEM W/O & W/DYE: CPT

## 2025-01-15 PROCEDURE — 2550000001 HC RX 255 CONTRASTS: Performed by: PSYCHIATRY & NEUROLOGY

## 2025-01-15 PROCEDURE — A9575 INJ GADOTERATE MEGLUMI 0.1ML: HCPCS | Performed by: PSYCHIATRY & NEUROLOGY

## 2025-01-15 PROCEDURE — 70553 MRI BRAIN STEM W/O & W/DYE: CPT | Performed by: RADIOLOGY

## 2025-01-15 RX ORDER — GADOTERATE MEGLUMINE 376.9 MG/ML
18 INJECTION INTRAVENOUS
Status: COMPLETED | OUTPATIENT
Start: 2025-01-15 | End: 2025-01-15

## 2025-01-15 RX ADMIN — GADOTERATE MEGLUMINE 18 ML: 376.9 INJECTION INTRAVENOUS at 14:23

## 2025-01-23 ENCOUNTER — OFFICE VISIT (OUTPATIENT)
Dept: PRIMARY CARE | Facility: CLINIC | Age: 80
End: 2025-01-23
Payer: MEDICARE

## 2025-01-23 VITALS
WEIGHT: 187 LBS | SYSTOLIC BLOOD PRESSURE: 153 MMHG | DIASTOLIC BLOOD PRESSURE: 65 MMHG | OXYGEN SATURATION: 98 % | HEART RATE: 87 BPM | BODY MASS INDEX: 28.67 KG/M2

## 2025-01-23 DIAGNOSIS — E11.22 TYPE 2 DIABETES MELLITUS WITH STAGE 3A CHRONIC KIDNEY DISEASE, WITHOUT LONG-TERM CURRENT USE OF INSULIN (MULTI): ICD-10-CM

## 2025-01-23 DIAGNOSIS — N18.31 TYPE 2 DIABETES MELLITUS WITH STAGE 3A CHRONIC KIDNEY DISEASE, WITHOUT LONG-TERM CURRENT USE OF INSULIN (MULTI): ICD-10-CM

## 2025-01-23 DIAGNOSIS — I10 ESSENTIAL HYPERTENSION: ICD-10-CM

## 2025-01-23 DIAGNOSIS — Z87.39 HISTORY OF GOUT: ICD-10-CM

## 2025-01-23 DIAGNOSIS — Z00.00 ROUTINE MEDICAL EXAM: Primary | ICD-10-CM

## 2025-01-23 DIAGNOSIS — I48.19 PERSISTENT ATRIAL FIBRILLATION (MULTI): ICD-10-CM

## 2025-01-23 DIAGNOSIS — Z23 ENCOUNTER FOR IMMUNIZATION: ICD-10-CM

## 2025-01-23 PROBLEM — S76.312A: Status: ACTIVE | Noted: 2025-01-23

## 2025-01-23 PROBLEM — L02.419 CELLULITIS AND ABSCESS OF UPPER EXTREMITY: Status: RESOLVED | Noted: 2023-08-22 | Resolved: 2025-01-23

## 2025-01-23 PROBLEM — S09.90XA INJURY OF HEAD: Status: RESOLVED | Noted: 2023-08-22 | Resolved: 2025-01-23

## 2025-01-23 PROBLEM — R42 LIGHTHEADEDNESS: Status: RESOLVED | Noted: 2023-08-22 | Resolved: 2025-01-23

## 2025-01-23 PROBLEM — I27.20 PULMONARY HTN (MULTI): Status: RESOLVED | Noted: 2023-08-22 | Resolved: 2025-01-23
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
## 2025-01-23 PROBLEM — E11.9 TYPE 2 DIABETES MELLITUS WITHOUT COMPLICATION, WITHOUT LONG-TERM CURRENT USE OF INSULIN (MULTI): Status: RESOLVED | Noted: 2023-08-22 | Resolved: 2025-01-23

## 2025-01-23 PROBLEM — L03.119 CELLULITIS AND ABSCESS OF UPPER EXTREMITY: Status: RESOLVED | Noted: 2023-08-22 | Resolved: 2025-01-23

## 2025-01-23 LAB
POC APPEARANCE, URINE: CLEAR
POC BILIRUBIN, URINE: NEGATIVE
POC BLOOD, URINE: NEGATIVE
POC COLOR, URINE: YELLOW
POC GLUCOSE, URINE: NEGATIVE MG/DL
POC HEMOGLOBIN A1C: 5.7 % (ref 4.2–6.5)
POC KETONES, URINE: NEGATIVE MG/DL
POC LEUKOCYTES, URINE: NEGATIVE
POC NITRITE,URINE: NEGATIVE
POC PH, URINE: 5.5 PH
POC PROTEIN, URINE: ABNORMAL MG/DL
POC SPECIFIC GRAVITY, URINE: 1.02
POC UROBILINOGEN, URINE: 0.2 EU/DL

## 2025-01-23 PROCEDURE — 1170F FXNL STATUS ASSESSED: CPT | Performed by: PHYSICIAN ASSISTANT

## 2025-01-23 PROCEDURE — G0009 ADMIN PNEUMOCOCCAL VACCINE: HCPCS | Performed by: PHYSICIAN ASSISTANT

## 2025-01-23 PROCEDURE — 1126F AMNT PAIN NOTED NONE PRSNT: CPT | Performed by: PHYSICIAN ASSISTANT

## 2025-01-23 PROCEDURE — 1160F RVW MEDS BY RX/DR IN RCRD: CPT | Performed by: PHYSICIAN ASSISTANT

## 2025-01-23 PROCEDURE — 81003 URINALYSIS AUTO W/O SCOPE: CPT | Mod: QW | Performed by: PHYSICIAN ASSISTANT

## 2025-01-23 PROCEDURE — 1036F TOBACCO NON-USER: CPT | Performed by: PHYSICIAN ASSISTANT

## 2025-01-23 PROCEDURE — 83036 HEMOGLOBIN GLYCOSYLATED A1C: CPT | Performed by: PHYSICIAN ASSISTANT

## 2025-01-23 PROCEDURE — 3077F SYST BP >= 140 MM HG: CPT | Performed by: PHYSICIAN ASSISTANT

## 2025-01-23 PROCEDURE — 1158F ADVNC CARE PLAN TLK DOCD: CPT | Performed by: PHYSICIAN ASSISTANT

## 2025-01-23 PROCEDURE — 1157F ADVNC CARE PLAN IN RCRD: CPT | Performed by: PHYSICIAN ASSISTANT

## 2025-01-23 PROCEDURE — 1123F ACP DISCUSS/DSCN MKR DOCD: CPT | Performed by: PHYSICIAN ASSISTANT

## 2025-01-23 PROCEDURE — 82043 UR ALBUMIN QUANTITATIVE: CPT | Performed by: PHYSICIAN ASSISTANT

## 2025-01-23 PROCEDURE — 1159F MED LIST DOCD IN RCRD: CPT | Performed by: PHYSICIAN ASSISTANT

## 2025-01-23 PROCEDURE — 99215 OFFICE O/P EST HI 40 MIN: CPT | Mod: 25 | Performed by: PHYSICIAN ASSISTANT

## 2025-01-23 PROCEDURE — G0439 PPPS, SUBSEQ VISIT: HCPCS | Performed by: PHYSICIAN ASSISTANT

## 2025-01-23 PROCEDURE — 3078F DIAST BP <80 MM HG: CPT | Performed by: PHYSICIAN ASSISTANT

## 2025-01-23 ASSESSMENT — ACTIVITIES OF DAILY LIVING (ADL)
USING TELEPHONE: INDEPENDENT
PREPARING MEALS: INDEPENDENT
JUDGMENT_ADEQUATE_SAFELY_COMPLETE_DAILY_ACTIVITIES: YES
PILL BOX USED: NO
EATING: INDEPENDENT
DOING HOUSEWORK: INDEPENDENT
FEEDING: INDEPENDENT
MANAGING FINANCES: INDEPENDENT
TAKING MEDICATION: INDEPENDENT
ADEQUATE_TO_COMPLETE_ADL: YES
STIL DRIVING: NO
TOILETING: INDEPENDENT
NEEDS ASSISTANCE WITH FOOD: INDEPENDENT
DRESSING: INDEPENDENT
GROCERY SHOPPING: INDEPENDENT
USING TRANSPORTATION: INDEPENDENT
BATHING: INDEPENDENT

## 2025-01-23 ASSESSMENT — PATIENT HEALTH QUESTIONNAIRE - PHQ9
2. FEELING DOWN, DEPRESSED OR HOPELESS: NOT AT ALL
1. LITTLE INTEREST OR PLEASURE IN DOING THINGS: NOT AT ALL
SUM OF ALL RESPONSES TO PHQ9 QUESTIONS 1 AND 2: 0

## 2025-01-23 ASSESSMENT — ENCOUNTER SYMPTOMS
HEADACHES: 0
ABDOMINAL PAIN: 0
LIGHT-HEADEDNESS: 0
SHORTNESS OF BREATH: 0
BLOOD IN STOOL: 0
DIZZINESS: 0

## 2025-01-23 ASSESSMENT — COLUMBIA-SUICIDE SEVERITY RATING SCALE - C-SSRS
2. HAVE YOU ACTUALLY HAD ANY THOUGHTS OF KILLING YOURSELF?: NO
1. IN THE PAST MONTH, HAVE YOU WISHED YOU WERE DEAD OR WISHED YOU COULD GO TO SLEEP AND NOT WAKE UP?: NO
6. HAVE YOU EVER DONE ANYTHING, STARTED TO DO ANYTHING, OR PREPARED TO DO ANYTHING TO END YOUR LIFE?: NO

## 2025-01-23 ASSESSMENT — PAIN SCALES - GENERAL: PAINLEVEL_OUTOF10: 0-NO PAIN

## 2025-01-23 ASSESSMENT — COGNITIVE AND FUNCTIONAL STATUS - GENERAL
TRAIL MAKING TEST: PATIENT COMPLETES TRAIL MAKING TEST PROPERLY.
VERBAL FLUENCY - ANIMAL NAMES (0 TO 25): 3

## 2025-01-24 ENCOUNTER — TELEPHONE (OUTPATIENT)
Dept: PRIMARY CARE | Facility: CLINIC | Age: 80
End: 2025-01-24
Payer: MEDICARE

## 2025-01-24 DIAGNOSIS — I10 ESSENTIAL HYPERTENSION: Primary | ICD-10-CM

## 2025-01-24 LAB
CREAT UR-MCNC: 116.4 MG/DL (ref 20–370)
MICROALBUMIN UR-MCNC: 29.2 MG/L
MICROALBUMIN/CREAT UR: 25.1 UG/MG CREAT

## 2025-01-24 RX ORDER — AMLODIPINE BESYLATE 10 MG/1
10 TABLET ORAL DAILY
Qty: 90 TABLET | Refills: 3 | Status: SHIPPED | OUTPATIENT
Start: 2025-01-24 | End: 2026-01-19

## 2025-01-24 NOTE — TELEPHONE ENCOUNTER
Please let pt know I spoke w/Dr. Albarran who wants him to increase his amlodipine to 10mg. I will send him increased dose and he can discuss further at his FU visit.

## 2025-02-21 ENCOUNTER — TELEPHONE (OUTPATIENT)
Dept: PRIMARY CARE | Facility: CLINIC | Age: 80
End: 2025-02-21
Payer: MEDICARE

## 2025-02-21 NOTE — TELEPHONE ENCOUNTER
Pt's INR has been downtrending. Please confirm his daily dose of warfarin and see if he's made any changes on his own, or if he's had any dietary changes, recent infections, or new supplements.

## 2025-02-21 NOTE — TELEPHONE ENCOUNTER
Called and spoke with pt, who states that he has not made any changes at all. But starting today, pt will be taking warfarin 2mg 2 days out of the week, and then 4mg 5 days out of the week.

## 2025-03-27 DIAGNOSIS — I48.19 PERSISTENT ATRIAL FIBRILLATION (MULTI): ICD-10-CM

## 2025-03-27 RX ORDER — WARFARIN 2 MG/1
4 TABLET ORAL SEE ADMIN INSTRUCTIONS
Qty: 150 TABLET | Refills: 3 | Status: SHIPPED | OUTPATIENT
Start: 2025-03-27

## 2025-04-08 DIAGNOSIS — I10 ESSENTIAL HYPERTENSION: ICD-10-CM

## 2025-04-10 RX ORDER — LISINOPRIL 20 MG/1
20 TABLET ORAL DAILY
Qty: 90 TABLET | Refills: 3 | Status: SHIPPED | OUTPATIENT
Start: 2025-04-10

## 2025-04-22 DIAGNOSIS — I10 ESSENTIAL HYPERTENSION: ICD-10-CM

## 2025-04-22 RX ORDER — METOPROLOL TARTRATE 50 MG/1
50 TABLET ORAL 2 TIMES DAILY
Qty: 180 TABLET | Refills: 3 | Status: SHIPPED | OUTPATIENT
Start: 2025-04-22

## 2025-04-29 NOTE — PROGRESS NOTES
Subjective   Patient ID: Hunter Gorman is a 79 y.o. male.      HPI  79 y.o. male presents for a follow up visit regarding LUTS and an elevated PSA level. PSA level 6.4 in 09/2024. He was ordered a MRI of the prostate that was not completed.     The patient called Iman Kelly LPN for his PSA level. He decided to not go through with the MRI or make a follow up appointment. He said he may follow up in a year. She explained the risk factors for not following up about his increase in the PSA level and he noted he would think about it.     He would not like to do the MRI of the prostate due to not wanting to go to the location that was required for the MRI.     He is currently prescribed warfarin. He has never had to discontinue the medication in the past due to     He is experiencing ED. He did not have effectiveness with Viagra, Cialis or Betsy.     Lab Results   Component Value Date    PSA 6.4 (H) 09/05/2024    PSA 4.9 (H) 07/20/2022    PSA 3.3 07/12/2019         Review of Systems  A complete review of systems was performed. All systems are noted to be negative unless indicated in the history of present illness, impression, active problem list, or past histories.     Objective   Physical Exam  General: Well developed, well nourished, alert and cooperative, appears in no acute distress  Eyes: Non-injected conjunctiva, sclera clear, no proptosis  Cardiac: Extremities are warm and well perfused. No edema, cyanosis or pallor.   Lungs: Breathing is easy, non-labored. Speaking in clear and complete sentences. Normal diaphragmatic movement.  Abdomen: soft, non-tender  MSK: Ambulatory with steady gait, unassisted  Neuro: alert and oriented to person, place and time  Psych: Demonstrates good judgement and reason, without hallucinations, abnormal affect or abnormal behaviors.  Skin: no obvious lesions, no rashes.  : phallus circumcised, normal in size, no plaques or lesions. Testicles normal in size and texture, no  masses  WARREN: prostate enlarged, hard surface, no nodules     Assessment/Plan   Diagnoses and all orders for this visit:  Elevated PSA  -     PSA; Future      79 y.o. male presents for a follow up visit regarding LUTS and an elevated PSA level. PSA level 6.4 in 09/2024. He was ordered a MRI of the prostate that was not completed.     I personally reviewed the PSA level that was 6.4 in 09/2024. Due to the elevation and rising trend of the PSA level I would like to obtain another PSA level now.     The patient is willing to go to [place] to obtain the MRI of the prostate. I have informed the patient that the MRI of the prostate will indicate where to perform the biopsy. This is the importance of obtaining the MRI prior to the biopsy.     I reviewed with the patient that due to his previous use of ED medication that was ineffective, he will require advanced treatment. We will discuss this at his next visit.     Plan:  Repeat PSA  MRI of the prostate now   Will have a TRUS biopsy after MRI, might need to be referred to Dr. Gilliam to perform at [facility] (patient preference)  FUV in 1 month     Scribe Attestation   By signing my name below, I, David Contreras, Scribe attestation that this documentation has been prepared under the direction and in the presence of Misha Ramon MD.

## 2025-05-01 ENCOUNTER — APPOINTMENT (OUTPATIENT)
Dept: UROLOGY | Facility: CLINIC | Age: 80
End: 2025-05-01
Payer: MEDICARE

## 2025-05-01 DIAGNOSIS — R97.20 ELEVATED PSA: ICD-10-CM

## 2025-05-01 PROCEDURE — 1157F ADVNC CARE PLAN IN RCRD: CPT | Performed by: STUDENT IN AN ORGANIZED HEALTH CARE EDUCATION/TRAINING PROGRAM

## 2025-05-01 PROCEDURE — 99214 OFFICE O/P EST MOD 30 MIN: CPT | Performed by: STUDENT IN AN ORGANIZED HEALTH CARE EDUCATION/TRAINING PROGRAM

## 2025-05-01 PROCEDURE — G2211 COMPLEX E/M VISIT ADD ON: HCPCS | Performed by: STUDENT IN AN ORGANIZED HEALTH CARE EDUCATION/TRAINING PROGRAM

## 2025-05-01 PROCEDURE — 1159F MED LIST DOCD IN RCRD: CPT | Performed by: STUDENT IN AN ORGANIZED HEALTH CARE EDUCATION/TRAINING PROGRAM

## 2025-05-01 PROCEDURE — 1123F ACP DISCUSS/DSCN MKR DOCD: CPT | Performed by: STUDENT IN AN ORGANIZED HEALTH CARE EDUCATION/TRAINING PROGRAM

## 2025-05-01 NOTE — PATIENT INSTRUCTIONS
Please go to nearest outpatient  lab to get labs obtained prior to your next visit    Please call Radiology at [phone] to schedule imaging     Follow up visit in 1 month

## 2025-05-03 LAB — PSA SERPL-MCNC: 5.64 NG/ML

## 2025-05-08 ENCOUNTER — HOSPITAL ENCOUNTER (OUTPATIENT)
Dept: RADIOLOGY | Facility: HOSPITAL | Age: 80
Discharge: HOME | End: 2025-05-08
Payer: MEDICARE

## 2025-05-08 VITALS — WEIGHT: 186.95 LBS | BODY MASS INDEX: 28.66 KG/M2

## 2025-05-08 DIAGNOSIS — R97.20 ELEVATED PSA: ICD-10-CM

## 2025-05-08 PROCEDURE — 2550000001 HC RX 255 CONTRASTS: Performed by: STUDENT IN AN ORGANIZED HEALTH CARE EDUCATION/TRAINING PROGRAM

## 2025-05-08 PROCEDURE — A9575 INJ GADOTERATE MEGLUMI 0.1ML: HCPCS | Performed by: STUDENT IN AN ORGANIZED HEALTH CARE EDUCATION/TRAINING PROGRAM

## 2025-05-08 PROCEDURE — 76498 UNLISTED MR PROCEDURE: CPT

## 2025-05-08 PROCEDURE — 72197 MRI PELVIS W/O & W/DYE: CPT

## 2025-05-08 RX ORDER — GADOTERATE MEGLUMINE 376.9 MG/ML
17 INJECTION INTRAVENOUS
Status: COMPLETED | OUTPATIENT
Start: 2025-05-08 | End: 2025-05-08

## 2025-05-08 RX ADMIN — GADOTERATE MEGLUMINE 17 ML: 376.9 INJECTION INTRAVENOUS at 14:32

## 2025-05-20 ENCOUNTER — TELEPHONE (OUTPATIENT)
Dept: PRIMARY CARE | Facility: CLINIC | Age: 80
End: 2025-05-20
Payer: MEDICARE

## 2025-05-20 NOTE — TELEPHONE ENCOUNTER
Pt called stating he just got off phone with [facility] and they stated they are waiting for the prescription for testing supplies for diabetes and have not received it yet, please advise.    Pt stated they sent it over a week ago.

## 2025-06-03 NOTE — PROGRESS NOTES
Subjective   Patient ID: Hunter Gorman is a 79 y.o. male.      HPI  79 y.o. male presents for a follow up visit regarding LUTS and an elevated PSA level. PSA level 5.64.  An MRI of the prostate that was obtained on 05/08/2025, he returns for the results.     He is prescribed warfarin 2 mg for atrial fibrillation. His cardiologist is Isacc Canada MD. He was last seen on 07/18/2024.     MR PROSTATE WITH KEAGAN BOUNDARIES IF PIRADS 3 OR ABOVE; 5/8/2025   IMPRESSION:  1. PI-RADS 5 lesion measuring 2.1 cm, involving the left peripheral  zone and the posteromedial portion of the right peripheral zone at  the mid to basilar gland. There is suggestion of extraprostatic  extension as well as involvement of the adjacent neurovascular  bundle. No suspicious lymphadenopathy.  2. PI-RADS 4 lesion measuring 0.9 cm involving the right  posterolateral peripheral zone at the apical to mid gland.    Lab Results   Component Value Date    PSA 5.64 (H) 05/02/2025    PSA 6.4 (H) 09/05/2024    PSA 4.9 (H) 07/20/2022    PSA 3.3 07/12/2019         Review of Systems  A complete review of systems was performed. All systems are noted to be negative unless indicated in the history of present illness, impression, active problem list, or past histories.     Objective   Physical Exam    Assessment/Plan   Diagnoses and all orders for this visit:  Elevated PSA   organ imaging abnormality      79 y.o. male presents for a follow up visit regarding LUTS and an elevated PSA level. PSA level 5.64.  An MRI of the prostate that was obtained on 05/08/2025, he returns for the results.     I personally reviewed the MRI of the prostate that was obtained on 05/08/2025. The results indicate a PIRADS 5 lesion measuring 2.1 cm in the left peripheral zone and posteromedial portion of the right peripheral zone at the mid to basilar gland. There is a PIRADS 4 lesion measuring 0.9 cm involving the right posterolateral peripheral zone at the apical to mid  gland. It bulging and attempting to move outside of the prostate.     I personally reviewed the patient's recent PSA level that was elevated at 5.64. It is not trending quickly, however it continues to be elevated.     We discussed today the findings on his MRI. I reviewed with him first the PI-RADS classification, along with the risk of prostate cancer with each of the 1-5 scores. Regarding his MRI which showed a PI-RADS 4 and 5 lesion, I explained that these lesions typically are suspicious of malignancy, and that the targeted fusion biopsy of such lesions would be higher yield for malignancy overall and more so clinically significant tumors compared to systematic biopsy.    I explained to the patient how fusion targeted biopsies are performed, by importing the MRI image into the ultrasound machine, obtaining ultrasound images and overlapping those over the MRI images, in order to obtain good mapping that leads us towards the target lesion, also known as region of interest. I explained to him my approach by performing a biopsy of the target lesions with 2-3 cores, and the rest of the prostate systematically, with 1-2 cores from each sextant.    From the patient's perspective, he will have to stop any blood thinning medications, obtain urine to rule out infection, and receive oral prophylactic antibiotics the night before the procedure, the morning of the procedure, and the night of the procedure. He will also have to perform a Fleet enema the morning of the procedure, and we will give him an injection of gentamicin prior to perform the procedure.    I explained to him the risks associated with the procedure, starting with hematochezia and hematuria, worsening LUTS with the low chance of acute urinary tension, pelvic pain and discomfort for few days after the procedure, and most importantly bacterial prostatitis caused by the transrectal punctures. The risk of urinary infection is about 2 to 3% despite the  prophylactic antibiotics, and if this occurs, he will have to present to an emergency room with the possibility of hospital admission for a night or 2.    The patient understands the procedure and would like to proceed with the biopsy.    I have contacted Dr. Isacc Canada, his cardiologist via secure chat. I have contacted Dr. Canada regarding his need to hold his anticoagulant. He is understanding and has given clearance.     I have contacted Dr. Sarina Gilliam to have him perform the TRUS biopsy at [facility] as it is the patient's preference. He has agreed to have the patient transferred.       Plan:  TRUS biopsy   Refer to Dr. Gilliam to perform TRUS biopsy at [facility] (patient preference)  Clearance obtained to hold anticoagulant for biopsy by his cardiologist    Scribe Attestation   By signing my name below, I, David Contreras, [place] attestation that this documentation has been prepared under the direction and in the presence of Misha Ramon MD.

## 2025-06-05 ENCOUNTER — APPOINTMENT (OUTPATIENT)
Dept: UROLOGY | Facility: CLINIC | Age: 80
End: 2025-06-05
Payer: MEDICARE

## 2025-06-05 DIAGNOSIS — R93.89 GU ORGAN IMAGING ABNORMALITY: ICD-10-CM

## 2025-06-05 DIAGNOSIS — R97.20 ELEVATED PSA: Primary | ICD-10-CM

## 2025-06-05 PROCEDURE — G2211 COMPLEX E/M VISIT ADD ON: HCPCS | Performed by: STUDENT IN AN ORGANIZED HEALTH CARE EDUCATION/TRAINING PROGRAM

## 2025-06-05 PROCEDURE — 99214 OFFICE O/P EST MOD 30 MIN: CPT | Performed by: STUDENT IN AN ORGANIZED HEALTH CARE EDUCATION/TRAINING PROGRAM

## 2025-06-06 ENCOUNTER — TELEPHONE (OUTPATIENT)
Facility: CLINIC | Age: 80
End: 2025-06-06
Payer: MEDICARE

## 2025-06-12 ENCOUNTER — APPOINTMENT (OUTPATIENT)
Dept: UROLOGY | Facility: CLINIC | Age: 80
End: 2025-06-12
Payer: MEDICARE

## 2025-06-29 NOTE — PROGRESS NOTES
Cardiac Electrophysiology Office Visit     No chief complaint on file.       HPI     The patient is a 80- year-old male with a history of hypertension and permanent atrial fibrillation.  We have taken a rate control approach with anticoagulation and is here for his yearly follow-up.    He is doing well from the atrial fibrillation perspective.  He monitors his heart rates and they are never in a very fast range.  He walks on a treadmill regularly without difficulty.  He is having a prostate biopsy for high suspicion of prostate cancer in the coming days but other than that has no new medical issues.        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    Meibomian gland dysfunction 10/12/2023    Refractive error 10/12/2023    Rupture of left hamstring tendon 01/23/2025    History of gout 07/12/2024    Pseudophakia of both eyes 08/22/2023    Benign localized prostatic hyperplasia with lower urinary tract symptoms (LUTS) 08/22/2023    Chronic gout without tophus 08/22/2023    Eczema 08/22/2023    Essential hypertension 08/22/2023    Hyperlipidemia 08/22/2023    Left hip pain 08/22/2023    Left thigh pain 08/22/2023    Persistent atrial fibrillation (Multi) 08/22/2023    Syncope and collapse 08/22/2023    Tenosynovitis 08/22/2023    Type 2 diabetes mellitus with stage 3a chronic kidney disease, without long-term current use of insulin (Multi) 08/22/2023    Vitamin D deficiency 08/22/2023        Review of Systems   All other systems reviewed and are negative.         Current Outpatient Medications   Medication Instructions    acetaminophen (TYLENOL) 325 mg, Every 4 hours PRN    allopurinol (ZYLOPRIM) 300 mg, oral, Daily    alpha lipoic acid 200 mg capsule 1 capsule, Daily    amLODIPine (NORVASC) 10 mg, oral, Daily    aspirin 81 mg, Daily    blood sugar diagnostic strip 1 strip, Daily    calcium carb-vitamin D3-vit K2 600 mg-1,000 unit-90 mcg tablet 1 tablet,  Daily    carBAMazepine (TEGRETOL) 200 mg, oral, Daily    cetirizine (ZYRTEC) 10 mg, Daily    fluticasone (Flonase) 50 mcg/actuation nasal spray 1 spray, Daily    furosemide (LASIX) 20 mg, oral, Daily    lisinopril 20 mg, oral, Daily    lovastatin (Mevacor) 40 mg tablet TAKE TWO TABLETS BY MOUTH EVERY DAY WITH A MEAL    metoprolol tartrate (LOPRESSOR) 50 mg, oral, 2 times daily, With food    omega 3-dha-epa-fish oil (Fish OiL) 1,000 mg (120 mg-180 mg) capsule 1 capsule, Daily    pyridoxine (VITAMIN B-6) 100 mg, Daily    warfarin (JANTOVEN) 4 mg, oral, See admin instructions, Once daily except one tab Monday and saturday       Objective     There were no vitals filed for this visit.     Constitutional:       Appearance: Healthy appearance.   Pulmonary:      Effort: Pulmonary effort is normal.      Breath sounds: Normal breath sounds.   Cardiovascular:      PMI at left midclavicular line. Normal rate. Irregularly irregular rhythm.      Murmurs: There is a systolic murmur.      No gallop.  No click. No rub.   Pulses:     Intact distal pulses.   Abdominal:      General: Bowel sounds are normal.   Musculoskeletal:      Cervical back: Neck supple. Skin:     General: Skin is warm and dry.   Neurological:      General: No focal deficit present.          RELEVANT TESTING:     ECHO 07/17/2019:  Exam quality: fair   There is normal left ventricular systolic function.   Estimated ejection fraction is 55-59%.   The left atrial size is moderately dilated.   Right atrial cavity size is mildly dilated.   Mild aortic regurgitation.   Mild tricuspid regurgitation.   There is evidence of mild pulmonary hypertension with estimated  pulmonary artery systolic pressure of 46 mm Hg.    STRESS TEST 08/06/2012:  IMPRESSION:       No evidence of stress induced reversible myocardial  ischemia.     Left ventricular ejection fraction measures 55 %.        Lab Review:   Lab Results   Component Value Date    WBC 8.9 10/04/2024    HGB 14.0  10/04/2024    HCT 42.4 10/04/2024     10/04/2024    CHOL 177 04/25/2024    TRIG 77 04/25/2024    HDL 54.0 04/25/2024    ALT 15 04/25/2024    AST 25 04/25/2024     10/04/2024    K 4.4 10/04/2024     (H) 10/04/2024    CREATININE 1.39 (H) 01/13/2025    BUN 22 01/13/2025    CO2 27 10/04/2024    TSH 3.18 12/28/2020    PSA 5.64 (H) 05/02/2025    INR 1.9 (H) 09/18/2019    HGBA1C 5.7 01/23/2025    ALBUR 12 12/28/2020    ALBUR 12 12/28/2020       LVR6AI2-RLEm Score  Age >= 75: 2   Sex Male: 0   CHF History No: 0   HTN Yes: 1   Stroke/TIA/Thromboembolism No: 0   Vascular Dz: CAD/PAD/Aortic Plaque No: 0   DM Yes: 1   Total Score 4       ECG:  Atrial fibrillation with ventricular response around 60 bpm nonspecific IVCD with QRS duration of 130 ms QTc 440 ms    Assessment/plan:  Permanent atrial fibrillation  History as above.  Continue current regimen.  The patient can definitely have his prostate biopsy and hold his warfarin as needed for that procedure.    Problem List Items Addressed This Visit    None    Isacc Canada MD, RS  Cardiac Electrophysiology

## 2025-07-01 ENCOUNTER — OFFICE VISIT (OUTPATIENT)
Facility: CLINIC | Age: 80
End: 2025-07-01
Payer: MEDICARE

## 2025-07-01 VITALS — DIASTOLIC BLOOD PRESSURE: 68 MMHG | HEART RATE: 57 BPM | SYSTOLIC BLOOD PRESSURE: 142 MMHG | OXYGEN SATURATION: 96 %

## 2025-07-01 DIAGNOSIS — I48.19 PERSISTENT ATRIAL FIBRILLATION (MULTI): Primary | ICD-10-CM

## 2025-07-01 PROCEDURE — 1159F MED LIST DOCD IN RCRD: CPT | Performed by: INTERNAL MEDICINE

## 2025-07-01 PROCEDURE — 1126F AMNT PAIN NOTED NONE PRSNT: CPT | Performed by: INTERNAL MEDICINE

## 2025-07-01 PROCEDURE — 3077F SYST BP >= 140 MM HG: CPT | Performed by: INTERNAL MEDICINE

## 2025-07-01 PROCEDURE — 1036F TOBACCO NON-USER: CPT | Performed by: INTERNAL MEDICINE

## 2025-07-01 PROCEDURE — 3078F DIAST BP <80 MM HG: CPT | Performed by: INTERNAL MEDICINE

## 2025-07-01 PROCEDURE — G2211 COMPLEX E/M VISIT ADD ON: HCPCS | Performed by: INTERNAL MEDICINE

## 2025-07-01 PROCEDURE — 99213 OFFICE O/P EST LOW 20 MIN: CPT | Performed by: INTERNAL MEDICINE

## 2025-07-01 PROCEDURE — 93005 ELECTROCARDIOGRAM TRACING: CPT | Performed by: INTERNAL MEDICINE

## 2025-07-01 PROCEDURE — 99212 OFFICE O/P EST SF 10 MIN: CPT

## 2025-07-01 ASSESSMENT — ENCOUNTER SYMPTOMS
LOSS OF SENSATION IN FEET: 0
DEPRESSION: 0
OCCASIONAL FEELINGS OF UNSTEADINESS: 0

## 2025-07-01 ASSESSMENT — PAIN SCALES - GENERAL: PAINLEVEL_OUTOF10: 0-NO PAIN

## 2025-07-01 ASSESSMENT — LIFESTYLE VARIABLES: TOTAL SCORE: 0

## 2025-07-01 NOTE — ASSESSMENT & PLAN NOTE
Permanent atrial fibrillation  History as above.  Continue current regimen.  The patient can definitely have his prostate biopsy and hold his warfarin as needed for that procedure.

## 2025-07-03 LAB
ATRIAL RATE: 57 BPM
Q ONSET: 218 MS
QRS COUNT: 9 BEATS
QRS DURATION: 132 MS
QT INTERVAL: 448 MS
QTC CALCULATION(BAZETT): 439 MS
QTC FREDERICIA: 442 MS
R AXIS: 31 DEGREES
T AXIS: 86 DEGREES
T OFFSET: 442 MS
VENTRICULAR RATE: 58 BPM

## 2025-07-03 PROCEDURE — G0416 PROSTATE BIOPSY, ANY MTHD: HCPCS

## 2025-07-03 PROCEDURE — G0416 PROSTATE BIOPSY, ANY MTHD: HCPCS | Performed by: PATHOLOGY

## 2025-07-03 PROCEDURE — 88344 IMHCHEM/IMCYTCHM EA MLT ANTB: CPT | Performed by: PATHOLOGY

## 2025-07-03 PROCEDURE — 88344 IMHCHEM/IMCYTCHM EA MLT ANTB: CPT

## 2025-07-05 DIAGNOSIS — E78.2 MIXED HYPERLIPIDEMIA: ICD-10-CM

## 2025-07-06 RX ORDER — LOVASTATIN 40 MG/1
TABLET ORAL
Qty: 90 TABLET | Refills: 3 | Status: SHIPPED | OUTPATIENT
Start: 2025-07-06

## 2025-07-08 ENCOUNTER — LAB REQUISITION (OUTPATIENT)
Dept: LAB | Facility: HOSPITAL | Age: 80
End: 2025-07-08
Payer: MEDICARE

## 2025-07-08 DIAGNOSIS — R97.20 ELEVATED PROSTATE SPECIFIC ANTIGEN (PSA): ICD-10-CM

## 2025-07-11 ENCOUNTER — TELEPHONE (OUTPATIENT)
Dept: PRIMARY CARE | Facility: CLINIC | Age: 80
End: 2025-07-11
Payer: MEDICARE

## 2025-07-16 ENCOUNTER — APPOINTMENT (OUTPATIENT)
Dept: RADIOLOGY | Facility: HOSPITAL | Age: 80
End: 2025-07-16
Payer: MEDICARE

## 2025-07-16 ENCOUNTER — HOSPITAL ENCOUNTER (EMERGENCY)
Facility: HOSPITAL | Age: 80
Discharge: HOME | End: 2025-07-16
Attending: EMERGENCY MEDICINE
Payer: MEDICARE

## 2025-07-16 VITALS
WEIGHT: 182 LBS | TEMPERATURE: 97.5 F | RESPIRATION RATE: 19 BRPM | HEART RATE: 58 BPM | HEIGHT: 67 IN | OXYGEN SATURATION: 96 % | DIASTOLIC BLOOD PRESSURE: 64 MMHG | SYSTOLIC BLOOD PRESSURE: 143 MMHG | BODY MASS INDEX: 28.56 KG/M2

## 2025-07-16 DIAGNOSIS — Z79.01 ON CONTINUOUS ORAL ANTICOAGULATION: ICD-10-CM
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
## 2025-07-16 DIAGNOSIS — S09.90XA CLOSED HEAD INJURY, INITIAL ENCOUNTER: ICD-10-CM

## 2025-07-16 DIAGNOSIS — S00.81XA FOREHEAD ABRASION, INITIAL ENCOUNTER: ICD-10-CM

## 2025-07-16 LAB
INR PPP: 1.7 (ref 0.9–1.2)
LAB AP ASR DISCLAIMER: NORMAL
LAB AP BLOCK FOR ADDITIONAL STUDIES: NORMAL
LABORATORY COMMENT REPORT: NORMAL
PATH REPORT.FINAL DX SPEC: NORMAL
PATH REPORT.GROSS SPEC: NORMAL
PATH REPORT.RELEVANT HX SPEC: NORMAL
PATH REPORT.TOTAL CANCER: NORMAL
PROTHROMBIN TIME: 17.6 SECONDS (ref 9.3–12.7)

## 2025-07-16 PROCEDURE — 99291 CRITICAL CARE FIRST HOUR: CPT | Performed by: EMERGENCY MEDICINE

## 2025-07-16 PROCEDURE — 36415 COLL VENOUS BLD VENIPUNCTURE: CPT | Performed by: EMERGENCY MEDICINE

## 2025-07-16 PROCEDURE — 72125 CT NECK SPINE W/O DYE: CPT

## 2025-07-16 PROCEDURE — 70450 CT HEAD/BRAIN W/O DYE: CPT | Performed by: RADIOLOGY

## 2025-07-16 PROCEDURE — 73030 X-RAY EXAM OF SHOULDER: CPT | Mod: RIGHT SIDE | Performed by: RADIOLOGY

## 2025-07-16 PROCEDURE — 73564 X-RAY EXAM KNEE 4 OR MORE: CPT | Mod: RT

## 2025-07-16 PROCEDURE — 73030 X-RAY EXAM OF SHOULDER: CPT | Mod: RT

## 2025-07-16 PROCEDURE — 70450 CT HEAD/BRAIN W/O DYE: CPT

## 2025-07-16 PROCEDURE — 85610 PROTHROMBIN TIME: CPT | Performed by: EMERGENCY MEDICINE

## 2025-07-16 PROCEDURE — 99285 EMERGENCY DEPT VISIT HI MDM: CPT | Mod: 25

## 2025-07-16 PROCEDURE — 72125 CT NECK SPINE W/O DYE: CPT | Performed by: RADIOLOGY

## 2025-07-16 PROCEDURE — 73564 X-RAY EXAM KNEE 4 OR MORE: CPT | Mod: RIGHT SIDE | Performed by: RADIOLOGY

## 2025-07-16 PROCEDURE — 99285 EMERGENCY DEPT VISIT HI MDM: CPT | Mod: 25 | Performed by: EMERGENCY MEDICINE

## 2025-07-16 RX ORDER — ACETAMINOPHEN 325 MG/1
975 TABLET ORAL ONCE
Status: DISCONTINUED | OUTPATIENT
Start: 2025-07-16 | End: 2025-07-16 | Stop reason: HOSPADM

## 2025-07-16 ASSESSMENT — LIFESTYLE VARIABLES
HAVE PEOPLE ANNOYED YOU BY CRITICIZING YOUR DRINKING: NO
EVER FELT BAD OR GUILTY ABOUT YOUR DRINKING: NO
EVER HAD A DRINK FIRST THING IN THE MORNING TO STEADY YOUR NERVES TO GET RID OF A HANGOVER: NO
TOTAL SCORE: 0
HAVE YOU EVER FELT YOU SHOULD CUT DOWN ON YOUR DRINKING: NO

## 2025-07-16 ASSESSMENT — PAIN - FUNCTIONAL ASSESSMENT: PAIN_FUNCTIONAL_ASSESSMENT: 0-10

## 2025-07-16 ASSESSMENT — PAIN SCALES - GENERAL: PAINLEVEL_OUTOF10: 0 - NO PAIN

## 2025-07-16 NOTE — ED PROVIDER NOTES
HPI   No chief complaint on file.      Patient is a 80-year-old male presenting with concerns for mechanical fall.  He is on blood thinners.  History of A-fib.  Who is a mechanical fall.  Endorsing slight discomfort to his right shoulder and right knee as well.  No LOC.  States the most discomfort is his forehead that has an abrasion.  Patient denies fevers, chills, cough, sore throat, runny nose, chest pain, shortness of breath, abdominal pain, nausea, vomiting, diarrhea or urinary complaints.  Recent check of his INR was 1.3.              Patient History   Medical History[1]  Surgical History[2]  Family History[3]  Social History[4]    Physical Exam   ED Triage Vitals   Temperature Heart Rate Respirations BP   07/16/25 1042 07/16/25 1039 07/16/25 1040 07/16/25 1039   36.4 °C (97.5 °F) 61 18 143/64      Pulse Ox Temp src Heart Rate Source Patient Position   07/16/25 1036 -- -- --   94 %         BP Location FiO2 (%)     -- --             Physical Exam  Vitals and nursing note reviewed.   Constitutional:       Appearance: He is well-developed.      Comments: Awake, laying in examination bed   HENT:      Head: Normocephalic.      Comments: Abrasion of the forehead     Nose: Nose normal.      Mouth/Throat:      Mouth: Mucous membranes are moist.      Pharynx: Oropharynx is clear.     Eyes:      Extraocular Movements: Extraocular movements intact.      Conjunctiva/sclera: Conjunctivae normal.      Pupils: Pupils are equal, round, and reactive to light.       Cardiovascular:      Rate and Rhythm: Normal rate and regular rhythm.      Pulses: Normal pulses.      Heart sounds: Normal heart sounds. No murmur heard.  Pulmonary:      Effort: Pulmonary effort is normal. No respiratory distress.      Breath sounds: Normal breath sounds.   Abdominal:      General: Abdomen is flat.      Palpations: Abdomen is soft.      Tenderness: There is no abdominal tenderness.     Musculoskeletal:         General: No swelling. Normal range of  motion.      Cervical back: Normal range of motion and neck supple.     Skin:     General: Skin is warm and dry.      Capillary Refill: Capillary refill takes less than 2 seconds.     Neurological:      General: No focal deficit present.      Mental Status: He is alert and oriented to person, place, and time.     Psychiatric:         Mood and Affect: Mood normal.         Behavior: Behavior normal.           ED Course & MDM   Diagnoses as of 07/16/25 1206   Fall, initial encounter   Closed head injury, initial encounter   On continuous oral anticoagulation   Forehead abrasion, initial encounter                 No data recorded     Myrna Coma Scale Score: 15 (07/16/25 1034 : Chucho Gautam RN)                           Medical Decision Making  Patient is a 80-year-old male presenting with concerns for mechanical fall.  INR and imaging ordered.  Conditions considered include but are not limited to: Intracranial pathology, contusion, fracture, dislocation.    I saw this patient in conjunction with Dr. Solorzano.  INR of 1.7.  CT head and C-spine are negative.  X-ray of the right knee is without acute findings.  X-ray of right shoulder without acute findings.  Patient passed trial of ambulation without assistance.  I believe this patient is at low risk for complication, and a disposition of discharge is acceptable.  Return to the Emergency Department if new or worsening symptoms including headache, fever, chills, chest pain, shortness of breath, syncope, near syncope, abdominal pain, nausea, vomiting,  diarrhea, or worsening pain.  Patient is agreeable to disposition of discharge and to follow with respective fields in the next several days.    Portions of this note made with Dragon software, please be mindful of potential grammatical errors.      Medications   acetaminophen (Tylenol) tablet 975 mg (975 mg oral Not Given 7/16/25 1057)     Labs Reviewed   PROTIME-INR - Abnormal       Result Value    Protime 17.6 (*)      INR 1.7 (*)     Narrative:     INR Therapeutic Range: 2.0-3.5     XR shoulder right 2+ views   Final Result   No acute fracture or dislocation.        Acromioclavicular arthrosis.             MACRO:   None        Signed by: Mason Cristobal 2025 11:50 AM   Dictation workstation:   [id number]      XR knee right 4+ views   Final Result   No evident acute fracture or significant effusion.        Osteoarthrosis, chondrocalcinosis, and arterial vascular   calcifications.             MACRO:   None        Signed by: Mason Cristobal 2025 11:47 AM   Dictation workstation:   [id number]      CT cervical spine wo IV contrast   Final Result   No evidence of an acute fracture. Degenerate changes.        MACRO:   None.        Signed by: Emanuel Ramirez 2025 11:09 AM   Dictation workstation:   [id number]      CT head wo IV contrast   Final Result   No evidence of an acute intracranial process.        MACRO:   None.        Signed by: Emanuel Ramirez 2025 11:06 AM   Dictation workstation:   [id number]            Procedure  Procedures       [1]   Past Medical History:  Diagnosis Date    Benign neoplasm of left choroid     Diabetes mellitus without complication     Other specified inflammations of eyelid     Unspecified disorder of refraction    [2]   Past Surgical History:  Procedure Laterality Date    CATARACT EXTRACTION W/  INTRAOCULAR LENS IMPLANT Bilateral     OD 2021 +18.5D, OS 2021 +18.5D Dr Omalley   [3]   Family History  Problem Relation Name Age of Onset    Heart disease Mother      Heart attack Mother      Heart disease Father      Heart attack Father     [4]   Social History  Tobacco Use    Smoking status: Former     Current packs/day: 0.00     Types: Cigarettes     Quit date: 5/3/1983     Years since quittin.2    Smokeless tobacco: Never   Vaping Use    Vaping status: Never Used   Substance Use Topics    Alcohol use: Not Currently    Drug use: Never        Dixon Hanley  MARLENE  07/16/25 1202

## 2025-07-16 NOTE — Clinical Note
Hunter Gorman was seen and treated in our emergency department on 7/16/2025.  He may return to work on 07/18/2025.       If you have any questions or concerns, please don't hesitate to call.      Dixno Hanley PA-C

## 2025-07-16 NOTE — ED PROVIDER NOTES
The patient was seen in conjunction with the advanced practice provider, and I performed a substantive portion of the visit. I personally saw the patient and made/approved the management plan and take responsibility for the patient management. All medical decision making was performed by me. All laboratory studies, radiology, and EKGs were reviewed by me.     History: 80-year-old male presents for mechanical fall after tripping over the door of his dog's cage this morning.  HIA trauma activated as patient did have a head injury without loss of consciousness but is on Coumadin for history of A-fib.  States he is having some mild soreness in his right shoulder and a bruise on his right knee but denies any other areas of injuries.  Had his INR checked on Friday of last week as he was off of it for the procedure was 1.3.  Physical exam:  General: Vitals reviewed. Awake, alert, well-developed, well-nourished, NAD, airway patent  HEENT: NC abrasion/contusion over mid forehead, no bony step-offs, PERRL, MMM, no facial contusions,  no bleeding per nares, septum midline   Neck: Supple, trachea midline, no midline C-spine tenderness , step-offs , or deformities, no expanding hematomas, no stridor   Respiratory: No respiratory distress, lungs clear to auscultation bilaterally, no wheezes, rhonchi, or rales  CV: Regular rate and regular rhythm, no murmur/gallop/rubs  Abdomen/GI: Soft, non-tender, non-distended, no rebound, guarding, or rigidity, normal bowel sounds  Extremities/MSK : Moving all extremities, no deformities, mild tenderness to palpation in the right anterior shoulder and right knee where there is an overlying contusion, no other bony tenderness to palpation in the extremities and range of motion of all joints within normal limits, compartments are soft, skin intact in the extremities, 2+ bilateral radial and DP pulses no midline TLS tenderness, step-offs , or deformities   Neuro: A/O x3, GCS 15, cranial nerves  intact, no new focal motor or sensory deficits  Skin: Warm, dry. No rashes identified    MDM: 80-year-old male presents for HIA trauma activation after mechanical fall from ground-level with head injury on Coumadin.  Having some minor discomfort in his right shoulder and knee low suspicion for fracture although will obtain x-rays as well as CT brain, cervical spine to evaluate for traumatic injuries.  Treated symptomatically with improvement.  Given he is on Coumadin INR checked which is 1.7 will need to continue this dosing as was recently off of this for procedure.  CT brain, cervical spine with no acute traumatic injuries.  X-ray right shoulder and right knee on my independent interpretation and confirmed by radiologist with no acute traumatic injuries.  At this time no traumatic injury requiring hospitalization and patient appropriate for outpatient management with close head injury instructions, supportive care and outpatient follow-up.  Return precautions discussed.         Ene Solorzano MD  07/18/25 0240

## 2025-07-16 NOTE — DISCHARGE INSTRUCTIONS
Follow with your primary care provider.  Increase oral hydration.  You might notice that you are getting slight headaches especially when you are having eyestrain.  Usually when you are reading a book or watching the television or your cell phone.    Be sure to take all medications, over the counter medications or prescription medications only as directed.    Be sure to follow up as directed in 1-2 days. All of the details of your follow up instructions are detailed in the follow up section of this packet.    If you are being discharged with any pains medications or muscle relaxers (norco, Vicodin, hydrocodone products, Percocet, oxycodone products, flexeril, cyclobenzaprine, robaxin, norflex, brand or generic, or any other pain controlling medications with the exception of Ibuprofen and regular Tylenol, do not drive or operate machinery, climb ladders or participate in any activity that could potentially put yourself or others at risk should you get dizzy, or be/feel impaired at all.    Return to emergency room without delay for ANY new or worsening pains or for any other symptoms or concerns. Return with worsening pains, nausea, vomiting, trouble breathing, palpitations, shortness of breath, inability to pass stool or urine, loss of control of stool or urine, any numbness or tingling (that is not normal for you), uncontrolled fevers, the passing of blood or other material in stool or urine, rashes, pains or for any other symptoms or concerns you may have. You are always welcome to return to the ER at any time for any reason or for any other concerns you may have.

## 2025-07-17 ENCOUNTER — APPOINTMENT (OUTPATIENT)
Facility: CLINIC | Age: 80
End: 2025-07-17
Payer: MEDICARE

## 2025-07-18 LAB
CHOLEST SERPL-MCNC: 168 MG/DL
CHOLEST/HDLC SERPL: 3.1 (CALC)
HDLC SERPL-MCNC: 55 MG/DL
LDLC SERPL CALC-MCNC: 96 MG/DL (CALC)
NONHDLC SERPL-MCNC: 113 MG/DL (CALC)
TRIGL SERPL-MCNC: 77 MG/DL
URATE SERPL-MCNC: 4.2 MG/DL (ref 4–8)

## 2025-07-18 NOTE — ED PROCEDURE NOTE
Procedure  Critical Care    Performed by: Ene Solorzano MD  Authorized by: Ene Solorzano MD    Critical care provider statement:     Critical care time (minutes):  31    Critical care time was exclusive of:  Separately billable procedures and treating other patients and teaching time    Critical care was necessary to treat or prevent imminent or life-threatening deterioration of the following conditions:  Trauma    Critical care was time spent personally by me on the following activities:  Ordering and performing treatments and interventions, ordering and review of laboratory studies, ordering and review of radiographic studies, pulse oximetry, re-evaluation of patient's condition, review of old charts, obtaining history from patient or surrogate, examination of patient, evaluation of patient's response to treatment and development of treatment plan with patient or surrogate               Ene Solorzano MD  07/18/25 1056

## 2025-07-29 ENCOUNTER — APPOINTMENT (OUTPATIENT)
Dept: UROLOGY | Facility: CLINIC | Age: 80
End: 2025-07-29
Payer: MEDICARE

## 2025-07-29 DIAGNOSIS — N52.01 ERECTILE DYSFUNCTION DUE TO ARTERIAL INSUFFICIENCY: ICD-10-CM

## 2025-07-29 DIAGNOSIS — C61 MALIGNANT NEOPLASM PROSTATE (MULTI): Primary | ICD-10-CM

## 2025-07-29 DIAGNOSIS — R97.20 ELEVATED PSA: ICD-10-CM

## 2025-07-29 PROCEDURE — 1159F MED LIST DOCD IN RCRD: CPT | Performed by: SURGERY

## 2025-07-29 PROCEDURE — 99215 OFFICE O/P EST HI 40 MIN: CPT | Performed by: SURGERY

## 2025-07-29 PROCEDURE — 1126F AMNT PAIN NOTED NONE PRSNT: CPT | Performed by: SURGERY

## 2025-07-29 PROCEDURE — 1160F RVW MEDS BY RX/DR IN RCRD: CPT | Performed by: SURGERY

## 2025-07-29 ASSESSMENT — PAIN SCALES - GENERAL: PAINLEVEL_OUTOF10: 0-NO PAIN

## 2025-07-29 NOTE — PROGRESS NOTES
Subjective   Patient ID: Hunter Gorman is a 80 y.o. male who presents for Results.    HPI  He is here after undergoing a fusion guided prostate biopsy.  His only complication was loose stools.  He had no hematuria.  His original PSA was 5.6.  He did have an MRI done.  In addition to this he has known erectile dysfunction.  He has had this for years.  He has tried 2 previous oral agents, both of which  have not improved his symptoms.            Objective   Physical Exam  Well nourished  Abdomen soft ,ND, NT              Assessment/Plan   Problem List Items Addressed This Visit    None  Visit Diagnoses         Codes      Malignant neoplasm prostate (Multi)    -  Primary C61      Elevated PSA     R97.20    Relevant Orders    Prostate Specific Antigen      Erectile dysfunction due to arterial insufficiency     N52.01            Prostate cancer.  We reviewed his prostate histology.  He has several small positive cores of [place] grade group 1 adenocarcinoma.  This places him at very low risk.  We discussed the natural history of localized prostate cancer.  I counseled him on various treatment options including active surveillance, surgery, and radiation.  Given his advanced age and low risk disease, I recommended active surveillance.  He agrees to this.  He will return and see me in 6 months with a PSA.    ED. We discussed second line treatment options.  I provided him with information on injection therapy.  He will consider this in the near future.      Greater than 50% of my office time today was spent in counseling and coordinating care.               Sarina Gilliam MD 07/29/25 11:37 AM

## 2025-07-30 ENCOUNTER — APPOINTMENT (OUTPATIENT)
Dept: PRIMARY CARE | Facility: CLINIC | Age: 80
End: 2025-07-30
Payer: MEDICARE

## 2025-07-30 ENCOUNTER — OFFICE VISIT (OUTPATIENT)
Dept: PRIMARY CARE | Facility: CLINIC | Age: 80
End: 2025-07-30
Payer: MEDICARE

## 2025-07-30 VITALS
WEIGHT: 183.2 LBS | DIASTOLIC BLOOD PRESSURE: 52 MMHG | HEIGHT: 67 IN | HEART RATE: 64 BPM | OXYGEN SATURATION: 98 % | BODY MASS INDEX: 28.75 KG/M2 | SYSTOLIC BLOOD PRESSURE: 110 MMHG

## 2025-07-30 DIAGNOSIS — I10 ESSENTIAL HYPERTENSION: ICD-10-CM

## 2025-07-30 DIAGNOSIS — E11.22 TYPE 2 DIABETES MELLITUS WITH STAGE 3A CHRONIC KIDNEY DISEASE, WITHOUT LONG-TERM CURRENT USE OF INSULIN (MULTI): Primary | ICD-10-CM

## 2025-07-30 DIAGNOSIS — N18.31 TYPE 2 DIABETES MELLITUS WITH STAGE 3A CHRONIC KIDNEY DISEASE, WITHOUT LONG-TERM CURRENT USE OF INSULIN (MULTI): Primary | ICD-10-CM

## 2025-07-30 DIAGNOSIS — M1A.9XX0 CHRONIC GOUT WITHOUT TOPHUS, UNSPECIFIED CAUSE, UNSPECIFIED SITE: ICD-10-CM

## 2025-07-30 DIAGNOSIS — E78.2 MIXED HYPERLIPIDEMIA: ICD-10-CM

## 2025-07-30 LAB — POC HEMOGLOBIN A1C: 5.7 % (ref 4.2–6.5)

## 2025-07-30 PROCEDURE — 1126F AMNT PAIN NOTED NONE PRSNT: CPT | Performed by: FAMILY MEDICINE

## 2025-07-30 PROCEDURE — 99214 OFFICE O/P EST MOD 30 MIN: CPT | Performed by: FAMILY MEDICINE

## 2025-07-30 PROCEDURE — 3078F DIAST BP <80 MM HG: CPT | Performed by: FAMILY MEDICINE

## 2025-07-30 PROCEDURE — 3074F SYST BP LT 130 MM HG: CPT | Performed by: FAMILY MEDICINE

## 2025-07-30 PROCEDURE — G2211 COMPLEX E/M VISIT ADD ON: HCPCS | Performed by: FAMILY MEDICINE

## 2025-07-30 PROCEDURE — 1159F MED LIST DOCD IN RCRD: CPT | Performed by: FAMILY MEDICINE

## 2025-07-30 PROCEDURE — 83036 HEMOGLOBIN GLYCOSYLATED A1C: CPT | Mod: QW | Performed by: FAMILY MEDICINE

## 2025-07-30 RX ORDER — ALLOPURINOL 300 MG/1
300 TABLET ORAL DAILY
Qty: 90 TABLET | Refills: 0 | Status: SHIPPED | OUTPATIENT
Start: 2025-07-30 | End: 2025-10-28

## 2025-07-30 RX ORDER — LOVASTATIN 40 MG/1
40 TABLET ORAL NIGHTLY
Qty: 90 TABLET | Refills: 0 | Status: SHIPPED | OUTPATIENT
Start: 2025-07-30 | End: 2025-07-30

## 2025-07-30 RX ORDER — METOPROLOL TARTRATE 50 MG/1
50 TABLET ORAL 2 TIMES DAILY
Qty: 180 TABLET | Refills: 0 | Status: SHIPPED | OUTPATIENT
Start: 2025-07-30

## 2025-07-30 RX ORDER — LISINOPRIL 20 MG/1
20 TABLET ORAL DAILY
Qty: 90 TABLET | Refills: 0 | Status: SHIPPED | OUTPATIENT
Start: 2025-07-30

## 2025-07-30 RX ORDER — AMLODIPINE BESYLATE 10 MG/1
10 TABLET ORAL DAILY
Qty: 90 TABLET | Refills: 0 | Status: SHIPPED | OUTPATIENT
Start: 2025-07-30 | End: 2025-10-28

## 2025-07-30 RX ORDER — FUROSEMIDE 20 MG/1
20 TABLET ORAL DAILY
Qty: 90 TABLET | Refills: 0 | Status: SHIPPED | OUTPATIENT
Start: 2025-07-30

## 2025-07-30 RX ORDER — NAPROXEN SODIUM 220 MG/1
81 TABLET, FILM COATED ORAL DAILY
Qty: 90 TABLET | Refills: 0 | Status: SHIPPED | OUTPATIENT
Start: 2025-07-30 | End: 2025-10-28

## 2025-07-30 RX ORDER — LOVASTATIN 40 MG/1
80 TABLET ORAL NIGHTLY
Qty: 90 TABLET | Refills: 0 | Status: SHIPPED | OUTPATIENT
Start: 2025-07-30 | End: 2026-07-30

## 2025-07-30 ASSESSMENT — PATIENT HEALTH QUESTIONNAIRE - PHQ9
1. LITTLE INTEREST OR PLEASURE IN DOING THINGS: NOT AT ALL
SUM OF ALL RESPONSES TO PHQ9 QUESTIONS 1 AND 2: 0
2. FEELING DOWN, DEPRESSED OR HOPELESS: NOT AT ALL

## 2025-07-30 ASSESSMENT — ENCOUNTER SYMPTOMS
LOSS OF SENSATION IN FEET: 0
DEPRESSION: 0
OCCASIONAL FEELINGS OF UNSTEADINESS: 0

## 2025-07-30 ASSESSMENT — PAIN SCALES - GENERAL: PAINLEVEL_OUTOF10: 0-NO PAIN

## 2025-07-30 NOTE — PROGRESS NOTES
80-year presents for follow-up chronic medical conditions      1. Type 2 diabetes mellitus with stage 3a chronic kidney disease, without long-term current use of insulin (Multi)   A1c's have been well-controlled in nondiabetic range controlled only with dietary intervention due to have A1c rechecked   2. Chronic gout without tophus, unspecified cause, unspecified site   Currently on 300 mg allopurinol last uric acid below 6 at goal no recent gout flares   3. Essential hypertension   Blood pressure 110/52 on current therapy   4. Mixed hyperlipidemia   Currently on 80 mg of lovastatin most recent LDL of 96 follows with cardiology regularly     All pertinent positive symptoms are included in history of present illness.    All other systems have been reviewed and are negative and noncontributory to this patient's current ailments.    CONSTITUTIONAL - INAD. Not ill appearing.  SKIN - No lesions or rashes visualized. No jaundice visualized.  HEENT- Atraumatic, normocephalic, no scleral icterus, external nares are not erythematous and without drainage, no neck masses visualized, oropharynx visualized and is without erythema or exudate  RESP - respiration not labored   CARDIAC - RRR. No murmurs. No gallops. No rubs.    ABDOMEN - nondistended.  NEURO- CNs II-XII grossly intact    1. Chronic gout without tophus, unspecified cause, unspecified site  Well-controlled continue.  No uric acid goal  - allopurinol (Zyloprim) 300 mg tablet; Take 1 tablet (300 mg) by mouth once daily.  Dispense: 90 tablet; Refill: 0    2. Essential hypertension  Well-controlled continue medication as prescribed  - amLODIPine (Norvasc) 10 mg tablet; Take 1 tablet (10 mg) by mouth once daily.  Dispense: 90 tablet; Refill: 0  - aspirin 81 mg chewable tablet; Chew and swallow 1 tablet (81 mg) once daily.  Dispense: 90 tablet; Refill: 0  - metoprolol tartrate (Lopressor) 50 mg tablet; Take 1 tablet by mouth 2 times a day. With food  Dispense: 180 tablet;  Refill: 0  - furosemide (Lasix) 20 mg tablet; Take 1 tablet (20 mg) by mouth once daily.  Dispense: 90 tablet; Refill: 0  - lisinopril 20 mg tablet; Take 1 tablet (20 mg) by mouth once daily.  Dispense: 90 tablet; Refill: 0    3. Mixed hyperlipidemia  LDL less than 100 should be preferably less than 70 will continue deferring treatment decisions to primary cardiologist.  Continue lovastatin as prescribed  - lovastatin (Mevacor) 40 mg tablet; Take 1 tablet (40 mg) by mouth once daily at bedtime.  Dispense: 90 tablet; Refill: 0    4. Type 2 diabetes mellitus with stage 3a chronic kidney disease, without long-term current use of insulin (Multi) (Primary)  Well-controlled essentially nondiabetic check A1c continue lifestyle inventions  - POCT glycosylated hemoglobin (Hb A1C) manually resulted

## 2025-08-20 ENCOUNTER — APPOINTMENT (OUTPATIENT)
Dept: UROLOGY | Facility: CLINIC | Age: 80
End: 2025-08-20
Payer: MEDICARE

## 2025-08-26 ENCOUNTER — TELEPHONE (OUTPATIENT)
Dept: PRIMARY CARE | Facility: CLINIC | Age: 80
End: 2025-08-26
Payer: MEDICARE

## 2025-08-26 DIAGNOSIS — K64.9 ACUTE HEMORRHOID: Primary | ICD-10-CM

## 2025-10-23 ENCOUNTER — APPOINTMENT (OUTPATIENT)
Dept: OPHTHALMOLOGY | Facility: CLINIC | Age: 80
End: 2025-10-23
Payer: MEDICARE

## 2026-02-12 ENCOUNTER — APPOINTMENT (OUTPATIENT)
Dept: UROLOGY | Facility: CLINIC | Age: 81
End: 2026-02-12
Payer: MEDICARE